# Patient Record
Sex: MALE | Race: WHITE | Employment: FULL TIME | URBAN - METROPOLITAN AREA
[De-identification: names, ages, dates, MRNs, and addresses within clinical notes are randomized per-mention and may not be internally consistent; named-entity substitution may affect disease eponyms.]

---

## 2018-02-12 ENCOUNTER — LAB ENCOUNTER (OUTPATIENT)
Dept: LAB | Facility: HOSPITAL | Age: 58
End: 2018-02-12
Attending: SPECIALIST
Payer: COMMERCIAL

## 2018-02-12 DIAGNOSIS — R05.9 COUGH: ICD-10-CM

## 2018-02-12 DIAGNOSIS — R50.9 FEVER, UNSPECIFIED: Primary | ICD-10-CM

## 2018-02-12 LAB
ADENOVIRUS PCR:: NEGATIVE
ALBUMIN SERPL-MCNC: 3.7 G/DL (ref 3.5–4.8)
ALP LIVER SERPL-CCNC: 80 U/L (ref 45–117)
ALT SERPL-CCNC: 23 U/L (ref 17–63)
AST SERPL-CCNC: 16 U/L (ref 15–41)
B PERT DNA SPEC QL NAA+PROBE: NEGATIVE
BASOPHILS # BLD AUTO: 0.02 X10(3) UL (ref 0–0.1)
BASOPHILS NFR BLD AUTO: 0.3 %
BILIRUB SERPL-MCNC: 0.4 MG/DL (ref 0.1–2)
BUN BLD-MCNC: 17 MG/DL (ref 8–20)
C PNEUM DNA SPEC QL NAA+PROBE: NEGATIVE
C-REACTIVE PROTEIN: 9.26 MG/DL (ref ?–1)
CALCIUM BLD-MCNC: 9.3 MG/DL (ref 8.3–10.3)
CHLORIDE: 102 MMOL/L (ref 101–111)
CK: 173 IU/L (ref 39–308)
CO2: 33 MMOL/L (ref 22–32)
CORONAVIRUS 229E PCR:: NEGATIVE
CORONAVIRUS HKU1 PCR:: NEGATIVE
CORONAVIRUS NL63 PCR:: NEGATIVE
CORONAVIRUS OC43 PCR:: NEGATIVE
CREAT BLD-MCNC: 1.04 MG/DL (ref 0.7–1.3)
EOSINOPHIL # BLD AUTO: 0.07 X10(3) UL (ref 0–0.3)
EOSINOPHIL NFR BLD AUTO: 1 %
ERYTHROCYTE [DISTWIDTH] IN BLOOD BY AUTOMATED COUNT: 12.7 % (ref 11.5–16)
FLUAV RNA SPEC QL NAA+PROBE: NEGATIVE
FLUBV RNA SPEC QL NAA+PROBE: NEGATIVE
GLUCOSE BLD-MCNC: 97 MG/DL (ref 70–99)
HCT VFR BLD AUTO: 44.7 % (ref 37–53)
HGB BLD-MCNC: 15.3 G/DL (ref 13–17)
IMMATURE GRANULOCYTE COUNT: 0.02 X10(3) UL (ref 0–1)
IMMATURE GRANULOCYTE RATIO %: 0.3 %
LYMPHOCYTES # BLD AUTO: 0.96 X10(3) UL (ref 0.9–4)
LYMPHOCYTES NFR BLD AUTO: 13.8 %
M PROTEIN MFR SERPL ELPH: 8.1 G/DL (ref 6.1–8.3)
MCH RBC QN AUTO: 30.8 PG (ref 27–33.2)
MCHC RBC AUTO-ENTMCNC: 34.2 G/DL (ref 31–37)
MCV RBC AUTO: 90.1 FL (ref 80–99)
METAPNEUMOVIRUS PCR:: NEGATIVE
MONOCYTES # BLD AUTO: 1.05 X10(3) UL (ref 0.1–1)
MONOCYTES NFR BLD AUTO: 15.1 %
MYCOPLASMA PNEUMONIA PCR:: NEGATIVE
NEUTROPHIL ABS PRELIM: 4.84 X10 (3) UL (ref 1.3–6.7)
NEUTROPHILS # BLD AUTO: 4.84 X10(3) UL (ref 1.3–6.7)
NEUTROPHILS NFR BLD AUTO: 69.5 %
PARAINFLUENZA 1 PCR:: NEGATIVE
PARAINFLUENZA 2 PCR:: NEGATIVE
PARAINFLUENZA 3 PCR:: NEGATIVE
PARAINFLUENZA 4 PCR:: NEGATIVE
PLATELET # BLD AUTO: 287 10(3)UL (ref 150–450)
POTASSIUM SERPL-SCNC: 4.5 MMOL/L (ref 3.6–5.1)
RBC # BLD AUTO: 4.96 X10(6)UL (ref 4.3–5.7)
RED CELL DISTRIBUTION WIDTH-SD: 42.5 FL (ref 35.1–46.3)
RHINOVIRUS/ENTERO PCR:: NEGATIVE
RSV RNA SPEC QL NAA+PROBE: NEGATIVE
SED RATE-ML: 29 MM/HR (ref 0–12)
SODIUM SERPL-SCNC: 138 MMOL/L (ref 136–144)
URIC ACID: 4.6 MG/DL (ref 2.4–8.7)
WBC # BLD AUTO: 7 X10(3) UL (ref 4–13)

## 2018-02-12 PROCEDURE — 87633 RESP VIRUS 12-25 TARGETS: CPT

## 2018-02-12 PROCEDURE — 87486 CHLMYD PNEUM DNA AMP PROBE: CPT

## 2018-02-12 PROCEDURE — 82550 ASSAY OF CK (CPK): CPT

## 2018-02-12 PROCEDURE — 36415 COLL VENOUS BLD VENIPUNCTURE: CPT

## 2018-02-12 PROCEDURE — 87798 DETECT AGENT NOS DNA AMP: CPT

## 2018-02-12 PROCEDURE — 86140 C-REACTIVE PROTEIN: CPT

## 2018-02-12 PROCEDURE — 85652 RBC SED RATE AUTOMATED: CPT

## 2018-02-12 PROCEDURE — 85025 COMPLETE CBC W/AUTO DIFF WBC: CPT

## 2018-02-12 PROCEDURE — 80053 COMPREHEN METABOLIC PANEL: CPT

## 2018-02-12 PROCEDURE — 84550 ASSAY OF BLOOD/URIC ACID: CPT

## 2018-02-12 PROCEDURE — 87581 M.PNEUMON DNA AMP PROBE: CPT

## 2018-10-13 ENCOUNTER — EMERGENCY (EMERGENCY)
Facility: HOSPITAL | Age: 58
LOS: 0 days | Discharge: HOME | End: 2018-10-13
Attending: EMERGENCY MEDICINE | Admitting: EMERGENCY MEDICINE
Payer: COMMERCIAL

## 2018-10-13 VITALS
SYSTOLIC BLOOD PRESSURE: 156 MMHG | RESPIRATION RATE: 20 BRPM | TEMPERATURE: 98 F | OXYGEN SATURATION: 97 % | DIASTOLIC BLOOD PRESSURE: 104 MMHG | HEART RATE: 77 BPM

## 2018-10-13 DIAGNOSIS — R51 HEADACHE: ICD-10-CM

## 2018-10-13 DIAGNOSIS — G44.009 CLUSTER HEADACHE SYNDROME, UNSPECIFIED, NOT INTRACTABLE: ICD-10-CM

## 2018-10-13 PROCEDURE — 99285 EMERGENCY DEPT VISIT HI MDM: CPT

## 2018-10-13 RX ORDER — SODIUM CHLORIDE 9 MG/ML
2000 INJECTION INTRAMUSCULAR; INTRAVENOUS; SUBCUTANEOUS ONCE
Qty: 0 | Refills: 0 | Status: COMPLETED | OUTPATIENT
Start: 2018-10-13 | End: 2018-10-13

## 2018-10-13 RX ORDER — MORPHINE SULFATE 50 MG/1
4 CAPSULE, EXTENDED RELEASE ORAL ONCE
Qty: 0 | Refills: 0 | Status: DISCONTINUED | OUTPATIENT
Start: 2018-10-13 | End: 2018-10-13

## 2018-10-13 RX ORDER — ONDANSETRON 8 MG/1
4 TABLET, FILM COATED ORAL ONCE
Qty: 0 | Refills: 0 | Status: COMPLETED | OUTPATIENT
Start: 2018-10-13 | End: 2018-10-13

## 2018-10-13 RX ORDER — ACETAMINOPHEN 500 MG
1000 TABLET ORAL ONCE
Qty: 0 | Refills: 0 | Status: COMPLETED | OUTPATIENT
Start: 2018-10-13 | End: 2018-10-13

## 2018-10-13 RX ADMIN — Medication 400 MILLIGRAM(S): at 11:57

## 2018-10-13 RX ADMIN — ONDANSETRON 4 MILLIGRAM(S): 8 TABLET, FILM COATED ORAL at 12:28

## 2018-10-13 RX ADMIN — MORPHINE SULFATE 4 MILLIGRAM(S): 50 CAPSULE, EXTENDED RELEASE ORAL at 11:57

## 2018-10-13 RX ADMIN — SODIUM CHLORIDE 2000 MILLILITER(S): 9 INJECTION INTRAMUSCULAR; INTRAVENOUS; SUBCUTANEOUS at 11:57

## 2018-10-13 NOTE — ED STATDOCS - NS_ ATTENDINGSCRIBEDETAILS _ED_A_ED_FT
I, Baljeet Rivero MD,  performed the initial face to face bedside interview with this patient regarding history of present illness, review of symptoms and relevant past medical, social and family history.  I completed an independent physical examination.    The history, relevant review of systems, past medical and surgical history, medical decision making, and physical examination was documented by the scribe in my presence and I attest to the accuracy of the documentation.

## 2018-10-13 NOTE — ED STATDOCS - ATTENDING CONTRIBUTION TO CARE
I, Baljeet Rivero MD,  performed the initial face to face bedside interview with this patient regarding history of present illness, review of symptoms and relevant past medical, social and family history.  I completed an independent physical examination.  I was the initial provider who evaluated this patient. I have signed out the follow up of any pending tests (i.e. labs, radiological studies) to the ACP.  I have communicated the patient’s plan of care and disposition with the ACP.

## 2018-10-13 NOTE — ED STATDOCS - PROGRESS NOTE DETAILS
PA NOTE: Pt seen by intake physician and orders/plan reviewed. 59 y/o M pmhx cluster headaches, p/w headache and vomiting today. Patient very difficult to obtain history from as he is in extreme pain. Reports that this headache feels like his cluster headaches, which he has been suffering from for many years; last cluster headache 3 weeks ago. Takes Imitrex and sometimes Imitrex inhaler but has tried both today and has not relieved symptoms. Reports that typically he requires oxygen, morphine and zofran to help with his cluster headaches when they are the worst. Reports that he has been hospitalized for his headaches in the past. Denies any vomiting, fevers, chills, neck pain.   PE: GEN: Well Appearing, Nontoxic, significant acute distress secondary to headache. HEENT: NC/AT, Symm Facies. PERRL, EOMI, MMM, posterior pharynx clear. CV: No JVD/Bruits or stridor;  +S1S2, RRR w/o m/g/r. RESP: CTAB w/o w/r/r. ABD: Soft, nt/nd, +BS. No guarding/rebound. No RUQ tender, no CVAT. EXT/MSK: No lower extremity edema or calf tenderness. WWP, palpable pulses. FROMx4. SKIN: No erythema, lesions or rash. Neuro: Grossly intact, AOX3 with normal speech, CN II-XII intact; Sensation intact, motor 5/5 throughout. Gait normal  A/P: Extreme discomfort with tearing and walking around secondary to pain. Provide pain control, O2, anti-emetics, CT head and reassess.   -Billie Mcguire PA-C patient feeling significantly improved, sleeping comfortably. refuses CT head. Dr. Rivero aware. Patient stable for d/c with instructions to continue taking his home medications for headaches and follow-up with neurologist/PCP for continued treatment. -Billie Mcguire PA-C

## 2018-10-13 NOTE — ED STATDOCS - OBJECTIVE STATEMENT
57 y/o male with a PMHx of cluster NAGY presents to the ED c/o HA and vomiting. Girlfriend notes pt suffers from cluster HAs and this is a normal episode for him. Pt has been on medication for 6 weeks, normally uses Amitrax inhaler and oxygen which relieves the pain but has not improved the sx today. Pt has had past hospitalizations for the HA.

## 2018-10-30 ENCOUNTER — EMERGENCY (EMERGENCY)
Facility: HOSPITAL | Age: 58
LOS: 0 days | Discharge: HOME | End: 2018-10-30
Attending: EMERGENCY MEDICINE | Admitting: EMERGENCY MEDICINE
Payer: COMMERCIAL

## 2018-10-30 VITALS
DIASTOLIC BLOOD PRESSURE: 75 MMHG | HEART RATE: 87 BPM | SYSTOLIC BLOOD PRESSURE: 128 MMHG | RESPIRATION RATE: 16 BRPM | OXYGEN SATURATION: 97 %

## 2018-10-30 VITALS — WEIGHT: 149.91 LBS | HEIGHT: 66 IN

## 2018-10-30 DIAGNOSIS — G44.009 CLUSTER HEADACHE SYNDROME, UNSPECIFIED, NOT INTRACTABLE: ICD-10-CM

## 2018-10-30 DIAGNOSIS — R51 HEADACHE: ICD-10-CM

## 2018-10-30 LAB
ALBUMIN SERPL ELPH-MCNC: 3.9 G/DL — SIGNIFICANT CHANGE UP (ref 3.3–5)
ALP SERPL-CCNC: 62 U/L — SIGNIFICANT CHANGE UP (ref 40–120)
ALT FLD-CCNC: 47 U/L — SIGNIFICANT CHANGE UP (ref 12–78)
ANION GAP SERPL CALC-SCNC: 5 MMOL/L — SIGNIFICANT CHANGE UP (ref 5–17)
APTT BLD: 27.4 SEC — LOW (ref 27.5–36.3)
AST SERPL-CCNC: 15 U/L — SIGNIFICANT CHANGE UP (ref 15–37)
BASOPHILS # BLD AUTO: 0.02 K/UL — SIGNIFICANT CHANGE UP (ref 0–0.2)
BASOPHILS NFR BLD AUTO: 0.2 % — SIGNIFICANT CHANGE UP (ref 0–2)
BILIRUB SERPL-MCNC: 0.4 MG/DL — SIGNIFICANT CHANGE UP (ref 0.2–1.2)
BUN SERPL-MCNC: 14 MG/DL — SIGNIFICANT CHANGE UP (ref 7–23)
CALCIUM SERPL-MCNC: 9 MG/DL — SIGNIFICANT CHANGE UP (ref 8.5–10.1)
CHLORIDE SERPL-SCNC: 103 MMOL/L — SIGNIFICANT CHANGE UP (ref 96–108)
CO2 SERPL-SCNC: 31 MMOL/L — SIGNIFICANT CHANGE UP (ref 22–31)
CREAT SERPL-MCNC: 0.98 MG/DL — SIGNIFICANT CHANGE UP (ref 0.5–1.3)
EOSINOPHIL # BLD AUTO: 0.02 K/UL — SIGNIFICANT CHANGE UP (ref 0–0.5)
EOSINOPHIL NFR BLD AUTO: 0.2 % — SIGNIFICANT CHANGE UP (ref 0–6)
GLUCOSE SERPL-MCNC: 96 MG/DL — SIGNIFICANT CHANGE UP (ref 70–99)
HCT VFR BLD CALC: 51.7 % — HIGH (ref 39–50)
HGB BLD-MCNC: 17.3 G/DL — HIGH (ref 13–17)
IMM GRANULOCYTES NFR BLD AUTO: 0.6 % — SIGNIFICANT CHANGE UP (ref 0–1.5)
INR BLD: 0.96 RATIO — SIGNIFICANT CHANGE UP (ref 0.88–1.16)
LYMPHOCYTES # BLD AUTO: 1.2 K/UL — SIGNIFICANT CHANGE UP (ref 1–3.3)
LYMPHOCYTES # BLD AUTO: 11.9 % — LOW (ref 13–44)
MCHC RBC-ENTMCNC: 32.3 PG — SIGNIFICANT CHANGE UP (ref 27–34)
MCHC RBC-ENTMCNC: 33.5 GM/DL — SIGNIFICANT CHANGE UP (ref 32–36)
MCV RBC AUTO: 96.5 FL — SIGNIFICANT CHANGE UP (ref 80–100)
MONOCYTES # BLD AUTO: 0.71 K/UL — SIGNIFICANT CHANGE UP (ref 0–0.9)
MONOCYTES NFR BLD AUTO: 7.1 % — SIGNIFICANT CHANGE UP (ref 2–14)
NEUTROPHILS # BLD AUTO: 8.06 K/UL — HIGH (ref 1.8–7.4)
NEUTROPHILS NFR BLD AUTO: 80 % — HIGH (ref 43–77)
NRBC # BLD: 0 /100 WBCS — SIGNIFICANT CHANGE UP (ref 0–0)
PLATELET # BLD AUTO: 213 K/UL — SIGNIFICANT CHANGE UP (ref 150–400)
POTASSIUM SERPL-MCNC: 4.2 MMOL/L — SIGNIFICANT CHANGE UP (ref 3.5–5.3)
POTASSIUM SERPL-SCNC: 4.2 MMOL/L — SIGNIFICANT CHANGE UP (ref 3.5–5.3)
PROT SERPL-MCNC: 7.4 GM/DL — SIGNIFICANT CHANGE UP (ref 6–8.3)
PROTHROM AB SERPL-ACNC: 10.6 SEC — SIGNIFICANT CHANGE UP (ref 10–12.9)
RBC # BLD: 5.36 M/UL — SIGNIFICANT CHANGE UP (ref 4.2–5.8)
RBC # FLD: 13.8 % — SIGNIFICANT CHANGE UP (ref 10.3–14.5)
SODIUM SERPL-SCNC: 139 MMOL/L — SIGNIFICANT CHANGE UP (ref 135–145)
WBC # BLD: 10.07 K/UL — SIGNIFICANT CHANGE UP (ref 3.8–10.5)
WBC # FLD AUTO: 10.07 K/UL — SIGNIFICANT CHANGE UP (ref 3.8–10.5)

## 2018-10-30 PROCEDURE — 99285 EMERGENCY DEPT VISIT HI MDM: CPT

## 2018-10-30 RX ORDER — KETOROLAC TROMETHAMINE 30 MG/ML
30 SYRINGE (ML) INJECTION ONCE
Qty: 0 | Refills: 0 | Status: DISCONTINUED | OUTPATIENT
Start: 2018-10-30 | End: 2018-10-30

## 2018-10-30 RX ORDER — ONDANSETRON 8 MG/1
4 TABLET, FILM COATED ORAL ONCE
Qty: 0 | Refills: 0 | Status: DISCONTINUED | OUTPATIENT
Start: 2018-10-30 | End: 2018-10-30

## 2018-10-30 RX ORDER — MORPHINE SULFATE 50 MG/1
6 CAPSULE, EXTENDED RELEASE ORAL ONCE
Qty: 0 | Refills: 0 | Status: DISCONTINUED | OUTPATIENT
Start: 2018-10-30 | End: 2018-10-30

## 2018-10-30 RX ORDER — ONDANSETRON 8 MG/1
4 TABLET, FILM COATED ORAL ONCE
Qty: 0 | Refills: 0 | Status: COMPLETED | OUTPATIENT
Start: 2018-10-30 | End: 2018-10-30

## 2018-10-30 RX ORDER — SODIUM CHLORIDE 9 MG/ML
2000 INJECTION INTRAMUSCULAR; INTRAVENOUS; SUBCUTANEOUS ONCE
Qty: 0 | Refills: 0 | Status: COMPLETED | OUTPATIENT
Start: 2018-10-30 | End: 2018-10-30

## 2018-10-30 RX ORDER — HYDROMORPHONE HYDROCHLORIDE 2 MG/ML
1 INJECTION INTRAMUSCULAR; INTRAVENOUS; SUBCUTANEOUS ONCE
Qty: 0 | Refills: 0 | Status: DISCONTINUED | OUTPATIENT
Start: 2018-10-30 | End: 2018-10-30

## 2018-10-30 RX ADMIN — HYDROMORPHONE HYDROCHLORIDE 1 MILLIGRAM(S): 2 INJECTION INTRAMUSCULAR; INTRAVENOUS; SUBCUTANEOUS at 19:28

## 2018-10-30 RX ADMIN — Medication 30 MILLIGRAM(S): at 17:48

## 2018-10-30 RX ADMIN — HYDROMORPHONE HYDROCHLORIDE 1 MILLIGRAM(S): 2 INJECTION INTRAMUSCULAR; INTRAVENOUS; SUBCUTANEOUS at 19:45

## 2018-10-30 RX ADMIN — ONDANSETRON 4 MILLIGRAM(S): 8 TABLET, FILM COATED ORAL at 17:48

## 2018-10-30 RX ADMIN — MORPHINE SULFATE 6 MILLIGRAM(S): 50 CAPSULE, EXTENDED RELEASE ORAL at 18:29

## 2018-10-30 RX ADMIN — SODIUM CHLORIDE 2000 MILLILITER(S): 9 INJECTION INTRAMUSCULAR; INTRAVENOUS; SUBCUTANEOUS at 17:52

## 2018-10-30 NOTE — ED ADULT NURSE NOTE - NSIMPLEMENTINTERV_GEN_ALL_ED
Implemented All Universal Safety Interventions:  [place] to call system. Call bell, personal items and telephone within reach. Instruct patient to call for assistance. Room bathroom lighting operational. Non-slip footwear when patient is off stretcher. Physically safe environment: no spills, clutter or unnecessary equipment. Stretcher in lowest position, wheels locked, appropriate side rails in place.

## 2018-10-30 NOTE — ED ADULT TRIAGE NOTE - WEIGHT METHOD
How are you feeling? OK    Are you having any pain? Where? Incisional pain, hip and back of leg pain is gone. Still  Has numbness in foot    Rate pain from 1-10 - N/A    Are you taking the pain RX? PRN    Do you think it's helping? Yes    Do you feel better than before surgery? Yes, leg pain gone. Numbness is getting better    Dermabond OK? yes    Is you incision red, swollen or bleeding? None, no fever    Are you having any trouble with nausea or constipation? none    Were your discharge instructions easy to understand? yes    Any other questions?    Confirm post op appt -  yes   stated

## 2018-10-30 NOTE — ED STATDOCS - OBJECTIVE STATEMENT
57 y/o male with a PMHx of cluster NAGY presents to the ED c/o NAGY x2 hours. HA is associated with nausea, vomiting. Girlfriend notes pt suffers from cluster HAs and this is a normal episode for him. Pt took Imitrex without relief. Pt seen in [facility] 3 weeks ago for same symptoms, has not followed up with neurology. No fever or any other acute complaints at this time. Wife states that Morphine and Zofran improved pt's symptoms previously.

## 2018-10-30 NOTE — ED ADULT NURSE NOTE - CHIEF COMPLAINT QUOTE
patient has a cluster headache that started 4 hours ago, took imitrex without relief.  pateint  was seen in ER 3 weeks ago for same.  no fever + nausea.  wife states morphine and zofran broke previous headache

## 2018-10-30 NOTE — ED ADULT TRIAGE NOTE - CHIEF COMPLAINT QUOTE
patient has a cluster headache that started 4 hours ago, took imitrex without relief.  pateint  was seen in ER 3 weeks ago for same.  no fever + nausea patient has a cluster headache that started 4 hours ago, took imitrex without relief.  pateint  was seen in ER 3 weeks ago for same.  no fever + nausea.  wife states morphine and zofran broke previous headache

## 2018-10-30 NOTE — ED STATDOCS - PROGRESS NOTE DETAILS
signed Carley Persaud PA-C Pt seen initially in intake by Dr Rivero.   58M PMH cluster HAs c/o severe right sided HA similar to prior. Girlfriend notes pt was recently tapered off prednisone and this historically triggers his HAs. Pt is in obvious extreme discomfort, holding his head in his hands and kicking the wall. Girlfriend notes morphine usually helps at this stage. Will order and reassess. signed Carley Persaud PA-C   Pt still having significant pain though no longer in extremis. Plan CT head and dilaudid. Pt agrees with plan of  care. signed Carley Persaud PA-C signed Carley Persaud PA-C  Pt alert, smiling, states pain is resolved. Pt notes he gets these HAs around spring and fall. Pt refuses CT, notes he has had numerous outpt imaging including CT, MRIs and angiograms. Pt notes his Symptoms were identical to prior episodes. GCS 15. Pt alert, NAD, ambulates with ease in ED. normocephalic atraumatic. no spinal tenderness. CN II-XII grossly intact. neg romberg, neg pronator drift, normal gait including tandem. pupils PERRL 4mm bilat, EOMI. no gross visual field deficit. no dysmetria, neg finger to nose, heel to shin.. outpt f/u neuro. return precautions given. Pt feeling well, agrees with DC and plan of care. getting ride home. No significant findings on labwork. Pt given copy of lab results.

## 2018-10-30 NOTE — ED ADULT NURSE REASSESSMENT NOTE - NS ED NURSE REASSESS COMMENT FT1
Pt shows signs of pain relief from medications. Pt refused to go to CT for head CT as pt states he recently had a CT. DAYRON Persaud made aware of pt refusal. Pt to be discharged. Pt aware as to plan of care at this time. Awaiting discharge instructions.

## 2018-10-31 PROBLEM — G44.009 CLUSTER HEADACHE SYNDROME, UNSPECIFIED, NOT INTRACTABLE: Chronic | Status: ACTIVE | Noted: 2018-10-13

## 2019-02-10 ENCOUNTER — EMERGENCY (EMERGENCY)
Facility: HOSPITAL | Age: 59
LOS: 0 days | Discharge: HOME | End: 2019-02-10
Attending: EMERGENCY MEDICINE | Admitting: EMERGENCY MEDICINE
Payer: COMMERCIAL

## 2019-02-10 VITALS
OXYGEN SATURATION: 98 % | RESPIRATION RATE: 19 BRPM | SYSTOLIC BLOOD PRESSURE: 100 MMHG | HEART RATE: 71 BPM | TEMPERATURE: 98 F | DIASTOLIC BLOOD PRESSURE: 57 MMHG

## 2019-02-10 VITALS
OXYGEN SATURATION: 99 % | WEIGHT: 149.91 LBS | SYSTOLIC BLOOD PRESSURE: 140 MMHG | HEIGHT: 66 IN | DIASTOLIC BLOOD PRESSURE: 88 MMHG | TEMPERATURE: 98 F | HEART RATE: 76 BPM | RESPIRATION RATE: 20 BRPM

## 2019-02-10 DIAGNOSIS — Z87.442 PERSONAL HISTORY OF URINARY CALCULI: ICD-10-CM

## 2019-02-10 DIAGNOSIS — R11.2 NAUSEA WITH VOMITING, UNSPECIFIED: ICD-10-CM

## 2019-02-10 DIAGNOSIS — R10.9 UNSPECIFIED ABDOMINAL PAIN: ICD-10-CM

## 2019-02-10 DIAGNOSIS — R11.10 VOMITING, UNSPECIFIED: ICD-10-CM

## 2019-02-10 DIAGNOSIS — Z88.8 ALLERGY STATUS TO OTHER DRUGS, MEDICAMENTS AND BIOLOGICAL SUBSTANCES: ICD-10-CM

## 2019-02-10 DIAGNOSIS — Z87.19 PERSONAL HISTORY OF OTHER DISEASES OF THE DIGESTIVE SYSTEM: ICD-10-CM

## 2019-02-10 LAB
ALBUMIN SERPL ELPH-MCNC: 4.3 G/DL — SIGNIFICANT CHANGE UP (ref 3.3–5)
ALP SERPL-CCNC: 79 U/L — SIGNIFICANT CHANGE UP (ref 40–120)
ALT FLD-CCNC: 28 U/L — SIGNIFICANT CHANGE UP (ref 12–78)
ANION GAP SERPL CALC-SCNC: 7 MMOL/L — SIGNIFICANT CHANGE UP (ref 5–17)
APPEARANCE UR: CLEAR — SIGNIFICANT CHANGE UP
AST SERPL-CCNC: 19 U/L — SIGNIFICANT CHANGE UP (ref 15–37)
BACTERIA # UR AUTO: NEGATIVE — SIGNIFICANT CHANGE UP
BASOPHILS # BLD AUTO: 0.04 K/UL — SIGNIFICANT CHANGE UP (ref 0–0.2)
BASOPHILS NFR BLD AUTO: 0.2 % — SIGNIFICANT CHANGE UP (ref 0–2)
BILIRUB SERPL-MCNC: 0.6 MG/DL — SIGNIFICANT CHANGE UP (ref 0.2–1.2)
BILIRUB UR-MCNC: NEGATIVE — SIGNIFICANT CHANGE UP
BUN SERPL-MCNC: 17 MG/DL — SIGNIFICANT CHANGE UP (ref 7–23)
CALCIUM SERPL-MCNC: 8.7 MG/DL — SIGNIFICANT CHANGE UP (ref 8.5–10.1)
CHLORIDE SERPL-SCNC: 102 MMOL/L — SIGNIFICANT CHANGE UP (ref 96–108)
CO2 SERPL-SCNC: 29 MMOL/L — SIGNIFICANT CHANGE UP (ref 22–31)
COLOR SPEC: YELLOW — SIGNIFICANT CHANGE UP
CREAT SERPL-MCNC: 1.17 MG/DL — SIGNIFICANT CHANGE UP (ref 0.5–1.3)
DIFF PNL FLD: ABNORMAL
EOSINOPHIL # BLD AUTO: 0.14 K/UL — SIGNIFICANT CHANGE UP (ref 0–0.5)
EOSINOPHIL NFR BLD AUTO: 0.9 % — SIGNIFICANT CHANGE UP (ref 0–6)
EPI CELLS # UR: NEGATIVE — SIGNIFICANT CHANGE UP
GLUCOSE SERPL-MCNC: 122 MG/DL — HIGH (ref 70–99)
GLUCOSE UR QL: NEGATIVE MG/DL — SIGNIFICANT CHANGE UP
HCT VFR BLD CALC: 54.8 % — HIGH (ref 39–50)
HGB BLD-MCNC: 18.6 G/DL — HIGH (ref 13–17)
IMM GRANULOCYTES NFR BLD AUTO: 0.4 % — SIGNIFICANT CHANGE UP (ref 0–1.5)
KETONES UR-MCNC: NEGATIVE — SIGNIFICANT CHANGE UP
LEUKOCYTE ESTERASE UR-ACNC: NEGATIVE — SIGNIFICANT CHANGE UP
LIDOCAIN IGE QN: 156 U/L — SIGNIFICANT CHANGE UP (ref 73–393)
LYMPHOCYTES # BLD AUTO: 1.33 K/UL — SIGNIFICANT CHANGE UP (ref 1–3.3)
LYMPHOCYTES # BLD AUTO: 8.2 % — LOW (ref 13–44)
MAGNESIUM SERPL-MCNC: 2 MG/DL — SIGNIFICANT CHANGE UP (ref 1.6–2.6)
MCHC RBC-ENTMCNC: 31.9 PG — SIGNIFICANT CHANGE UP (ref 27–34)
MCHC RBC-ENTMCNC: 33.9 GM/DL — SIGNIFICANT CHANGE UP (ref 32–36)
MCV RBC AUTO: 94 FL — SIGNIFICANT CHANGE UP (ref 80–100)
MONOCYTES # BLD AUTO: 1.15 K/UL — HIGH (ref 0–0.9)
MONOCYTES NFR BLD AUTO: 7.1 % — SIGNIFICANT CHANGE UP (ref 2–14)
NEUTROPHILS # BLD AUTO: 13.48 K/UL — HIGH (ref 1.8–7.4)
NEUTROPHILS NFR BLD AUTO: 83.2 % — HIGH (ref 43–77)
NITRITE UR-MCNC: NEGATIVE — SIGNIFICANT CHANGE UP
NRBC # BLD: 0 /100 WBCS — SIGNIFICANT CHANGE UP (ref 0–0)
PH UR: 7 — SIGNIFICANT CHANGE UP (ref 5–8)
PLATELET # BLD AUTO: 246 K/UL — SIGNIFICANT CHANGE UP (ref 150–400)
POTASSIUM SERPL-MCNC: 4 MMOL/L — SIGNIFICANT CHANGE UP (ref 3.5–5.3)
POTASSIUM SERPL-SCNC: 4 MMOL/L — SIGNIFICANT CHANGE UP (ref 3.5–5.3)
PROT SERPL-MCNC: 7.8 GM/DL — SIGNIFICANT CHANGE UP (ref 6–8.3)
PROT UR-MCNC: 15 MG/DL
RBC # BLD: 5.83 M/UL — HIGH (ref 4.2–5.8)
RBC # FLD: 12.1 % — SIGNIFICANT CHANGE UP (ref 10.3–14.5)
RBC CASTS # UR COMP ASSIST: SIGNIFICANT CHANGE UP /HPF (ref 0–4)
SODIUM SERPL-SCNC: 138 MMOL/L — SIGNIFICANT CHANGE UP (ref 135–145)
SP GR SPEC: 1.01 — SIGNIFICANT CHANGE UP (ref 1.01–1.02)
UROBILINOGEN FLD QL: NEGATIVE MG/DL — SIGNIFICANT CHANGE UP
WBC # BLD: 16.2 K/UL — HIGH (ref 3.8–10.5)
WBC # FLD AUTO: 16.2 K/UL — HIGH (ref 3.8–10.5)
WBC UR QL: NEGATIVE — SIGNIFICANT CHANGE UP

## 2019-02-10 PROCEDURE — 74177 CT ABD & PELVIS W/CONTRAST: CPT | Mod: 26

## 2019-02-10 PROCEDURE — 93010 ELECTROCARDIOGRAM REPORT: CPT

## 2019-02-10 PROCEDURE — 99284 EMERGENCY DEPT VISIT MOD MDM: CPT | Mod: 25

## 2019-02-10 RX ORDER — KETOROLAC TROMETHAMINE 30 MG/ML
30 SYRINGE (ML) INJECTION ONCE
Qty: 0 | Refills: 0 | Status: DISCONTINUED | OUTPATIENT
Start: 2019-02-10 | End: 2019-02-10

## 2019-02-10 RX ORDER — SODIUM CHLORIDE 9 MG/ML
1000 INJECTION INTRAMUSCULAR; INTRAVENOUS; SUBCUTANEOUS ONCE
Qty: 0 | Refills: 0 | Status: COMPLETED | OUTPATIENT
Start: 2019-02-10 | End: 2019-02-10

## 2019-02-10 RX ORDER — DIPHENHYDRAMINE HCL 50 MG
50 CAPSULE ORAL ONCE
Qty: 0 | Refills: 0 | Status: COMPLETED | OUTPATIENT
Start: 2019-02-10 | End: 2019-02-10

## 2019-02-10 RX ORDER — FAMOTIDINE 10 MG/ML
20 INJECTION INTRAVENOUS ONCE
Qty: 0 | Refills: 0 | Status: COMPLETED | OUTPATIENT
Start: 2019-02-10 | End: 2019-02-10

## 2019-02-10 RX ORDER — HYDROMORPHONE HYDROCHLORIDE 2 MG/ML
1 INJECTION INTRAMUSCULAR; INTRAVENOUS; SUBCUTANEOUS ONCE
Qty: 0 | Refills: 0 | Status: DISCONTINUED | OUTPATIENT
Start: 2019-02-10 | End: 2019-02-10

## 2019-02-10 RX ORDER — ONDANSETRON 8 MG/1
8 TABLET, FILM COATED ORAL ONCE
Qty: 0 | Refills: 0 | Status: COMPLETED | OUTPATIENT
Start: 2019-02-10 | End: 2019-02-10

## 2019-02-10 RX ORDER — ONDANSETRON 8 MG/1
4 TABLET, FILM COATED ORAL ONCE
Qty: 0 | Refills: 0 | Status: COMPLETED | OUTPATIENT
Start: 2019-02-10 | End: 2019-02-10

## 2019-02-10 RX ORDER — ONDANSETRON 8 MG/1
1 TABLET, FILM COATED ORAL
Qty: 15 | Refills: 0 | OUTPATIENT
Start: 2019-02-10

## 2019-02-10 RX ORDER — ACETAMINOPHEN 500 MG
1000 TABLET ORAL ONCE
Qty: 0 | Refills: 0 | Status: COMPLETED | OUTPATIENT
Start: 2019-02-10 | End: 2019-02-10

## 2019-02-10 RX ADMIN — ONDANSETRON 8 MILLIGRAM(S): 8 TABLET, FILM COATED ORAL at 05:07

## 2019-02-10 RX ADMIN — SODIUM CHLORIDE 1000 MILLILITER(S): 9 INJECTION INTRAMUSCULAR; INTRAVENOUS; SUBCUTANEOUS at 08:08

## 2019-02-10 RX ADMIN — Medication 0.5 MILLIGRAM(S): at 08:14

## 2019-02-10 RX ADMIN — Medication 30 MILLIGRAM(S): at 05:35

## 2019-02-10 RX ADMIN — Medication 125 MILLIGRAM(S): at 09:32

## 2019-02-10 RX ADMIN — SODIUM CHLORIDE 1000 MILLILITER(S): 9 INJECTION INTRAMUSCULAR; INTRAVENOUS; SUBCUTANEOUS at 09:54

## 2019-02-10 RX ADMIN — Medication 400 MILLIGRAM(S): at 08:54

## 2019-02-10 RX ADMIN — Medication 30 MILLIGRAM(S): at 05:17

## 2019-02-10 RX ADMIN — HYDROMORPHONE HYDROCHLORIDE 1 MILLIGRAM(S): 2 INJECTION INTRAMUSCULAR; INTRAVENOUS; SUBCUTANEOUS at 09:45

## 2019-02-10 RX ADMIN — SODIUM CHLORIDE 1000 MILLILITER(S): 9 INJECTION INTRAMUSCULAR; INTRAVENOUS; SUBCUTANEOUS at 05:07

## 2019-02-10 RX ADMIN — FAMOTIDINE 20 MILLIGRAM(S): 10 INJECTION INTRAVENOUS at 05:07

## 2019-02-10 RX ADMIN — SODIUM CHLORIDE 2000 MILLILITER(S): 9 INJECTION INTRAMUSCULAR; INTRAVENOUS; SUBCUTANEOUS at 06:57

## 2019-02-10 RX ADMIN — SODIUM CHLORIDE 2000 MILLILITER(S): 9 INJECTION INTRAMUSCULAR; INTRAVENOUS; SUBCUTANEOUS at 08:54

## 2019-02-10 RX ADMIN — HYDROMORPHONE HYDROCHLORIDE 1 MILLIGRAM(S): 2 INJECTION INTRAMUSCULAR; INTRAVENOUS; SUBCUTANEOUS at 09:11

## 2019-02-10 RX ADMIN — ONDANSETRON 4 MILLIGRAM(S): 8 TABLET, FILM COATED ORAL at 08:37

## 2019-02-10 RX ADMIN — Medication 50 MILLIGRAM(S): at 09:32

## 2019-02-10 NOTE — ED PROVIDER NOTE - OBJECTIVE STATEMENT
Pt. is a 58 yo M with a hx of cluster headache, kidney stones, ileitis now retired and lives alone presents BIB ambulance for sudden onset of nausea and vomiting for 6 hours.  Pt. states he had dull achy abdominal cramps all day.  Pt. had small normal BM last night.  Pt. denies any travel, chest pain, shortness of breath, blood in vomit or stool.  Pt. took 1  zofran ODT last night without relief.

## 2019-02-10 NOTE — ED ADULT NURSE NOTE - NSIMPLEMENTINTERV_GEN_ALL_ED
Implemented All Universal Safety Interventions:  [place] to call system. Call bell, personal items and telephone within reach. Instruct patient to call for assistance. Room bathroom lighting operational. Non-slip footwear when patient is off stretcher. Physically safe environment: no spills, clutter or unnecessary equipment. Stretcher in lowest position, wheels locked, appropriate side rails in place.

## 2019-02-10 NOTE — ED PROVIDER NOTE - NSFOLLOWUPINSTRUCTIONS_ED_ALL_ED_FT

## 2019-02-10 NOTE — ED ADULT NURSE REASSESSMENT NOTE - NS ED NURSE REASSESS COMMENT FT1
after receiving ativan pt hands and feet began to get itchy, hand swelling and lip swelling noted. Medications given per orders. Will CTM

## 2019-02-10 NOTE — ED ADULT NURSE REASSESSMENT NOTE - NS ED NURSE REASSESS COMMENT FT1
pt tolerating PO at this time, pt states itching and lip swelling having subsided, airway intact. Ok for d/c per MD olguin.

## 2019-02-10 NOTE — ED PROVIDER NOTE - PROGRESS NOTE DETAILS
Attending Kapoor, reviewed with pt results of tests.  Pt still with some nausea and allergies to reglan.  Pt on xanax at home.  Will give ativan 0.5 mg iv.  pt states is not driving. Attending Kapoor, pt states that he is on long acting opiot - like suboxione without the narcan (alergic to narcan).  Pt has not been able to take medication for 24 hours.  Possible pt symptoms could be withdrawal related.  Will give Dilaudid and more fluids. Ted LAIRD for ED attending, Dr. Kapoor: Pt now with swelling of hands and feet, itching of hands and feet, possible swelling of the lips. Pt states symptoms started after Ativan. Questionable allergic reaction to Ativan. Plan: Solumedrol and Benadryl. Attending Kapoor, pt feeling better, swelling hands improved.  No longer nauseous.  Plan po challenge and if tolerate then d.c.  Pt with probable allergies to IV ativan.

## 2019-07-03 PROBLEM — Z00.00 ENCOUNTER FOR PREVENTIVE HEALTH EXAMINATION: Status: ACTIVE | Noted: 2019-07-03

## 2019-07-12 ENCOUNTER — APPOINTMENT (OUTPATIENT)
Dept: DERMATOLOGY | Facility: CLINIC | Age: 59
End: 2019-07-12
Payer: COMMERCIAL

## 2019-07-12 ENCOUNTER — APPOINTMENT (OUTPATIENT)
Dept: DERMATOLOGY | Facility: CLINIC | Age: 59
End: 2019-07-12

## 2019-07-12 VITALS — BODY MASS INDEX: 29.16 KG/M2 | WEIGHT: 175 LBS | HEIGHT: 65 IN

## 2019-07-12 PROCEDURE — 99202 OFFICE O/P NEW SF 15 MIN: CPT

## 2019-07-29 ENCOUNTER — APPOINTMENT (OUTPATIENT)
Dept: DERMATOLOGY | Facility: CLINIC | Age: 59
End: 2019-07-29
Payer: COMMERCIAL

## 2019-07-29 VITALS — SYSTOLIC BLOOD PRESSURE: 120 MMHG | DIASTOLIC BLOOD PRESSURE: 80 MMHG

## 2019-07-29 DIAGNOSIS — R61 GENERALIZED HYPERHIDROSIS: ICD-10-CM

## 2019-07-29 DIAGNOSIS — Z12.83 ENCOUNTER FOR SCREENING FOR MALIGNANT NEOPLASM OF SKIN: ICD-10-CM

## 2019-07-29 DIAGNOSIS — Z85.828 PERSONAL HISTORY OF OTHER MALIGNANT NEOPLASM OF SKIN: ICD-10-CM

## 2019-07-29 DIAGNOSIS — D22.9 MELANOCYTIC NEVI, UNSPECIFIED: ICD-10-CM

## 2019-07-29 PROCEDURE — 99214 OFFICE O/P EST MOD 30 MIN: CPT | Mod: GC

## 2019-07-29 RX ORDER — ALUMINUM CHLORIDE 20 %
20 SOLUTION, NON-ORAL TOPICAL
Qty: 1 | Refills: 0 | Status: ACTIVE | COMMUNITY
Start: 2019-07-29 | End: 1900-01-01

## 2019-07-29 RX ORDER — BUPROPION HYDROCHLORIDE 300 MG/1
300 TABLET, EXTENDED RELEASE ORAL
Refills: 0 | Status: ACTIVE | COMMUNITY

## 2019-07-29 RX ORDER — MIRTAZAPINE 30 MG/1
30 TABLET, FILM COATED ORAL
Refills: 0 | Status: ACTIVE | COMMUNITY

## 2021-02-26 NOTE — ED STATDOCS - CARE PLAN
"Chief Complaint   Patient presents with   • Coronary Artery Disease     F/V DX: Coronary artery disease involving native coronary artery of native heart without angina pectoris       Subjective:   Nancy King is a 85 y.o. female who presents today for annual follow up of coronary artery disease.    Since the patient's last visit on 02/19/20, she has been doing well clinically. She denies chest pain, shortness of breath, palpitations, nausea/vomiting or diaphoresis.    Past Medical History:   Diagnosis Date   • Acute venous embolism and thrombosis of unspecified deep vessels of lower extremity    • Angina pectoris 5/11/2012    \"with stress\"   • Benign essential HTN 5/11/2012   • CAD (coronary artery disease) 5/11/2012   • Cancer (HCC)     right breast   • Chronic anticoagulation 5/11/2012   • Heart burn    • Hyperlipidemia 5/11/2012   • Hyperlipoproteinemia 5/11/2012   • Indigestion    • Obesity 5/11/2012   • Other specified disorder of intestines     diarrhea   • Pulmonary embolism (HCC)    • Unspecified cataract     removed bilat   • Unspecified disorder of thyroid    • Unspecified urinary incontinence      Past Surgical History:   Procedure Laterality Date   • MASTECTOMY  1/12/2015    Performed by Dwight Birch M.D. at SURGERY [facility]   • NODE BIOPSY SENTINEL  1/12/2015    Performed by Dwight Birch M.D. at SURGERY [facility]   • AXILLARY NODE DISSECTION  1/12/2015    Performed by Dwight Birch M.D. at SURGERY [facility]   • APPENDECTOMY     • CHOLECYSTECTOMY     • HYSTERECTOMY, TOTAL ABDOMINAL     • OTHER      Bladder   • TONSILLECTOMY AND ADENOIDECTOMY       Family History   Problem Relation Age of Onset   • Heart Disease Mother    • Heart Disease Father      Social History     Socioeconomic History   • Marital status:      Spouse name: Not on file   • Number of children: Not on file   • Years of education: Not on file   • Highest education level: Not on file "   Occupational History   • Not on file   Tobacco Use   • Smoking status: Never Smoker   • Smokeless tobacco: Never Used   Substance and Sexual Activity   • Alcohol use: No   • Drug use: No   • Sexual activity: Not on file   Other Topics Concern   • Not on file   Social History Narrative   • Not on file     Social Determinants of Health     Financial Resource Strain:    • Difficulty of Paying Living Expenses:    Food Insecurity:    • Worried About Running Out of Food in the Last Year:    • Ran Out of Food in the Last Year:    Transportation Needs:    • Lack of Transportation (Medical):    • Lack of Transportation (Non-Medical):    Physical Activity:    • Days of Exercise per Week:    • Minutes of Exercise per Session:    Stress:    • Feeling of Stress :    Social Connections:    • Frequency of Communication with Friends and Family:    • Frequency of Social Gatherings with Friends and Family:    • Attends [employer] Services:    • Active Member of Clubs or Organizations:    • Attends Club or Organization Meetings:    • Marital Status:    Intimate Partner Violence:    • Fear of Current or Ex-Partner:    • Emotionally Abused:    • Physically Abused:    • Sexually Abused:      Allergies   Allergen Reactions   • Sulfa Drugs      Swelling   • Levaquin      Rash     (Medications reviewed.)  Outpatient Encounter Medications as of 2/26/2021   Medication Sig Dispense Refill   • metoprolol (LOPRESSOR) 25 MG Tab TAKE 1 TABLET BY MOUTH TWO  TIMES DAILY 60 Tab 2   • losartan (COZAAR) 50 MG Tab Take 1 Tab by mouth every day. 90 Tab 3   • warfarin (COUMADIN) 1 MG Tab Take three tablets daily as directed by [facility] 270 Tab 1   • nitroglycerin (NITROSTAT) 0.4 MG SL Tab place 1 tablet under the tongue if needed for chest pain 25 Tab 5   • simvastatin (ZOCOR) 40 MG Tab TAKE 1 TABLET BY MOUTH  EVERY MORNING 90 Tab 0   • Capsaicin-Menthol (PAIN RELIEF EX) by Apply externally route.     • SITagliptin (JANUVIA) 100 MG  "Tab Take 100 mg by mouth every day.     • levothyroxine (SYNTHROID) 50 MCG TABS Take 50 mcg by mouth every morning before breakfast.     • Cholecalciferol (VITAMIN D PO) Take 1 Tab by mouth every morning.     • ascorbic acid (ASCORBIC ACID) 500 MG TABS Take 500 mg by mouth every morning.       No facility-administered encounter medications on file as of 2/26/2021.     Review of Systems   Constitutional: Negative.  Negative for chills, fever, malaise/fatigue and weight loss.   HENT: Negative.  Negative for hearing loss.    Eyes: Negative.  Negative for blurred vision and double vision.   Respiratory: Negative.  Negative for cough and shortness of breath.    Cardiovascular: Negative.  Negative for chest pain, palpitations, claudication and leg swelling.   Gastrointestinal: Negative.  Negative for abdominal pain, nausea and vomiting.   Genitourinary: Negative.  Negative for dysuria and urgency.   Musculoskeletal: Negative.  Negative for joint pain and myalgias.   Skin: Negative.  Negative for itching and rash.   Neurological: Negative.  Negative for dizziness, focal weakness, weakness and headaches.   Endo/Heme/Allergies: Negative.  Does not bruise/bleed easily.   Psychiatric/Behavioral: Negative.  Negative for depression. The patient is not nervous/anxious.         Objective:   /92 (BP Location: Left arm, Patient Position: Sitting, BP Cuff Size: Adult)   Pulse 66   Resp 14   Ht 1.651 m (5' 5\")   Wt 81.6 kg (180 lb)   SpO2 97%   BMI 29.95 kg/m²     Physical Exam   Constitutional: She is oriented to person, place, and time. She appears well-developed and well-nourished.   HENT:   Head: Normocephalic and atraumatic.   Eyes: EOM are normal.   Neck: No JVD present.   Cardiovascular: Normal rate, regular rhythm and normal heart sounds.   Pulmonary/Chest: Effort normal and breath sounds normal.   Abdominal: Soft. Bowel sounds are normal.   No hepatosplenomegaly.   Musculoskeletal:         General: Normal range of " motion.   Lymphadenopathy:     She has no cervical adenopathy.   Neurological: She is alert and oriented to person, place, and time.   Skin: Skin is warm and dry.   Psychiatric: She has a normal mood and affect.     CARDIAC STUDIES/PROCEDURES:     CTA OF CHEST (01/30/15)  1.  Extensive pulmonary thromboembolism involving majority of right pulmonary arteries and significant portions of left pulmonary arteries  2.  No other significant finding  3.  Right breast postoperative changes  4.  Aortic atherosclerotic plaque  5.  Infiltration of liver     ECHOCARDIOGRAM CONCLUSIONS (06/11/18)  Prior echo done 02/01/15. Compared to the report of the study done -   there has been no significant change.   Normal left ventricular systolic function.  Left ventricular ejection fraction is visually estimated to be 60%.  Grade I diastolic dysfunction.  Mild mitral regurgitation.  Mild tricuspid regurgitation.  Estimated right ventricular systolic pressure is 25 mmHg.     ECHOCARDIOGRAM CONCLUSIONS (02/01/15)  No prior study is available for comparison.   Normal left ventricular size and function. Grade I diastolic   dysfunction - mitral inflow E/A is <1.0. Left ventricular ejection   fraction is 60% to 65%. Mild concentric left ventricular hypertrophy.  No significant valve abnormalities.   Right ventricular systolic pressure is estimated to be 45-50 mmHg.  Normal pericardium without effusion.     EKG performed on (05/24/18) EKG shows sinus rhythm with diffuse ST abnormalities.     Laboratory results of (06/15/18) Cholesterol profile of 172/147/63/80 noted.  Laboratory results of (02/05/15) Cholesterol profile of 129/144/34/66 noted.     LOWER EXTREMITY VENOUS ULTRASOUND (01/30/15)  1.  Normal right lower extremity superficial and deep venous examination.   2.  Left lower extremity deep and superficial venous thrombosis.   3.  Left popliteal cyst.     MYOCARDIAL PERFUSION STUDY CONCLUSIONS (06/15/18)  Very small reversible defect is  noted at the apical cap in the stress images, concerning for ischemia. Artifact cannot be ruled out.  Normal left ventricular wall motion, with EF of 75%.   ECG INTERPRETATION  Negative stress ECG for ischemia.    Assessment:     1. Coronary artery disease involving native coronary artery of native heart without angina pectoris     2. Benign essential HTN     3. Dyslipidemia         Medical Decision Making:  Today's Assessment / Status / Plan:     1. Coronary artery disease (of left anterior descending artery diagnosed in [place], [place] 1984): She remains clinically doing well. I will continue with current medical care including metoprolol, losartan and simvastatin.  2. Hypertension: Blood pressure is high today, however, usually well controlled. We will continue with medical therapy including beta blockade therapy and angiotensin receptor blockade. She will continue to monitor her blood pressure and contact us if her blood pressure remains elevated.  3. Hyperlipidemia: She is doing well on statin therapy without myalgia symptoms. We will repeat labs including fasting lipid profile now and in oone year.  4. Pulmonary embolism with deep venous thrombosis on chronic anticoagulation therapy (warfarin): She is clinically doing well without recurrence or chest pain or shortness of breath. She has chronic edema of her left lower extremity.     We will follow up the patient in one year.     CC Dee Saldana      Principal Discharge DX:	Cluster headache  Assessment and plan of treatment:	1. Continue your home medications as directed for your cluster headaches   2. Follow-up with your neurologist or primary care physician for reevaluation and continued treatment   3. Return to the ER for any new or worsening symptoms

## 2021-10-13 NOTE — ED ADULT TRIAGE NOTE - PRO INTERPRETER NEED 2
From: Daniel Norman  Sent: 10/13/2021 1:16 PM CDT  To: Carmen Zhong Clinical Staff  Subject: Appointment    Can you schedule me for the next Saturday morning appointment available English

## 2023-03-29 ENCOUNTER — APPOINTMENT (OUTPATIENT)
Dept: PAIN MANAGEMENT | Facility: CLINIC | Age: 63
End: 2023-03-29

## 2023-06-27 NOTE — ED ADULT TRIAGE NOTE - PAIN RATING/NUMBER SCALE (0-10): REST
[facility]  your CPAP machine on Thursday 29th at10:15 AM  [email or web address]   [address]    _______________      You will receive your CPAP follow up appointment date today - please bring your CPAP machine and all the supplies +power cord to your follow up visit with me.    10

## 2023-07-11 NOTE — ED ADULT NURSE NOTE - RN DISCHARGE SIGNATURE
13-Oct-2018
Call bell, personal items and telephone in reach/Instruct patient to call for assistance before getting out of bed or chair/Non-slip footwear when patient is out of bed/[place] to call system/Physically safe environment - no spills, clutter or unnecessary equipment/Purposeful Proactive Rounding/Room/bathroom lighting operational, light cord in reach

## 2023-08-29 ENCOUNTER — TRANSCRIPTION ENCOUNTER (OUTPATIENT)
Age: 63
End: 2023-08-29

## 2023-08-29 ENCOUNTER — EMERGENCY (EMERGENCY)
Facility: HOSPITAL | Age: 63
LOS: 0 days | Discharge: HOME | End: 2023-08-29
Attending: EMERGENCY MEDICINE
Payer: COMMERCIAL

## 2023-08-29 VITALS
OXYGEN SATURATION: 98 % | RESPIRATION RATE: 18 BRPM | SYSTOLIC BLOOD PRESSURE: 125 MMHG | TEMPERATURE: 98 F | HEART RATE: 67 BPM | DIASTOLIC BLOOD PRESSURE: 90 MMHG

## 2023-08-29 VITALS — WEIGHT: 164.91 LBS | HEIGHT: 64 IN

## 2023-08-29 DIAGNOSIS — S92.511A DISPLACED FRACTURE OF PROXIMAL PHALANX OF RIGHT LESSER TOE(S), INITIAL ENCOUNTER FOR CLOSED FRACTURE: ICD-10-CM

## 2023-08-29 DIAGNOSIS — T14.8XXA OTHER INJURY OF UNSPECIFIED BODY REGION, INITIAL ENCOUNTER: ICD-10-CM
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
## 2023-08-29 DIAGNOSIS — Z88.8 ALLERGY STATUS TO OTHER DRUGS, MEDICAMENTS AND BIOLOGICAL SUBSTANCES: ICD-10-CM

## 2023-08-29 DIAGNOSIS — M79.674 PAIN IN RIGHT TOE(S): ICD-10-CM
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
## 2023-08-29 PROCEDURE — 28515 TREATMENT OF TOE FRACTURE: CPT | Mod: T9

## 2023-08-29 PROCEDURE — 73630 X-RAY EXAM OF FOOT: CPT | Mod: RT

## 2023-08-29 PROCEDURE — 73630 X-RAY EXAM OF FOOT: CPT | Mod: 26,RT,77

## 2023-08-29 PROCEDURE — 73630 X-RAY EXAM OF FOOT: CPT | Mod: 26,RT

## 2023-08-29 PROCEDURE — 99285 EMERGENCY DEPT VISIT HI MDM: CPT | Mod: 25

## 2023-08-29 PROCEDURE — 99285 EMERGENCY DEPT VISIT HI MDM: CPT

## 2023-08-29 RX ORDER — OXYCODONE HYDROCHLORIDE 5 MG/1
1 TABLET ORAL
Qty: 12 | Refills: 0
Start: 2023-08-29 | End: 2023-09-01

## 2023-08-29 NOTE — CONSULT NOTE ADULT - ASSESSMENT
Assesment: 62 y/o Male seen for the following:   -Close fracture of R 5th proximal phalanx, displaced  -Difficulty with ambulation      Plan:   Chart reviewed and Patient evaluated;  Discussed diagnosis and treatment with patient. Discussed importance of daily foot examinations, proper shoe gear, importance of tight glycemic control.   X-rays reviewed : on wet read showing cortical disruption of R 5th proximal phalanx with lateral deviation.   X-rays of post reduction showing adequate reduction of R 5th proximal phalanx  R 5th toe reduced in acceptable anatomical alignment in ED.   Applied kari splint to R foot  WBAT to R foot, surgical shoe for ambulation  RICE therapy  Pt to follow up with Dr. Garrett in 1 week as outpt  All additional care per ED appreciated  Patient demonstrated verbal understanding of all interventions and tolerated interventions well without any complications.       Case D/W attending Dr. Garrett

## 2023-08-29 NOTE — ED ADULT TRIAGE NOTE - CHIEF COMPLAINT QUOTE
pt c/o right 5th toe pain s/p hitting it into a wall. denies cp/sob/n/v/d/fever/chills. denies any significant pmh.

## 2023-08-29 NOTE — ED STATDOCS - PATIENT PORTAL LINK FT
You can access the FollowMyHealth Patient Portal offered by [facility] by registering at the following website: [email or web address]. By joining [facility]’s FollowMyHealth portal, you will also be able to view your health information using other applications (apps) compatible with our system.

## 2023-08-29 NOTE — PROCEDURE NOTE - NSPOSTCAREGUIDE_GEN_A_CORE
Verbal/written post procedure instructions were given to patient/caregiver/Instructed patient/caregiver to follow-up with primary care physician/Instructed patient/caregiver regarding signs and symptoms of infection/Elevate the injured extremity as instructed/Keep the cast/splint/dressing clean and dry/Understanding of care for injured extremity verbalized

## 2023-08-29 NOTE — CONSULT NOTE ADULT - SUBJECTIVE AND OBJECTIVE BOX
Subjective and Objective:   Date of Consult: 8/29/23  HPI: · Objective Statement: 64 yo male presents to the ED c/o 5th toe pain s/p hitting it into a wall.    PMH:   PSH:    Allergies:No Known Allergies      Labs:      WBC Trend      Chem      Vital Signs Last 24 Hrs  T(C): 36.5 (29 Aug 2023 10:44), Max: 36.5 (29 Aug 2023 10:44)  T(F): 97.7 (29 Aug 2023 10:44), Max: 97.7 (29 Aug 2023 10:44)  HR: 67 (29 Aug 2023 10:44) (67 - 67)  BP: 125/90 (29 Aug 2023 10:44) (125/90 - 125/90)  BP(mean): --  RR: 18 (29 Aug 2023 10:44) (18 - 18)  SpO2: 98% (29 Aug 2023 10:44) (98% - 98%)    Parameters below as of 29 Aug 2023 10:44  Patient On (Oxygen Delivery Method): room air            REVIEW OF SYSTEMS:    CONSTITUTIONAL: No weakness, fevers or chills  EYES: No visual changes  RESPIRATORY: No cough, wheezing; No shortness of breath  CARDIOVASCULAR: No chest pain or palpitations  GASTROINTESTINAL: No abdominal or epigastric pain. No nausea, vomiting; No diarrhea or constipation.   GENITOURINARY: No dysuria, frequency or hematuria  NEUROLOGICAL: No numbness or weakness  SKIN: See physical examination.  All other review of systems is negative unless indicated above    Physical Exam:   Constitutional: NAD, alert;  Lower Extremity Focus  Derm:  Skin warm, dry and supple bilateral.    No open wounds or lesions.    Vascular: Dorsalis Pedis and Posterior Tibial pulses 2/4.  Capillary re-fill time less then 3 seconds digits 1-5 bilateral.    Neuro: Protective sensation intact/diminished to the level of the digits bilateral.  MSK: Muscle strength 5/5 all major muscle groups bilateral. Lateral deviation of R 5th toe.

## 2023-08-29 NOTE — ED STATDOCS - OBJECTIVE STATEMENT
64 yo male presents to the ED c/o 5th toe pain s/p hitting it into a wall. 62 yo male presents to the ED c/o 5th toe pain s/p hitting it into a wall. Today.  + deformity to 5th toe right side.  No chest pain, no sob,

## 2023-08-29 NOTE — ED STATDOCS - ATTENDING APP SHARED VISIT CONTRIBUTION OF CARE
I, Lora Kapoor MD,  performed the initial face to face bedside interview with this patient regarding history of present illness, review of symptoms and relevant past medical, social and family history.  I completed an independent physical examination.  I was the initial provider who evaluated this patient.   I personally saw the patient and performed a substantive portion of the visit including all aspects of the medical decision making.  I have signed out the follow up of any pending tests (i.e. labs, radiological studies) to the TAYLOR.  I have communicated the patient’s plan of care and disposition with the TAYLOR.  The history, relevant review of systems, past medical and surgical history, medical decision making, and physical examination was documented by the scribe in my presence and I attest to the accuracy of the documentation.

## 2023-08-29 NOTE — ED STATDOCS - NSFOLLOWUPINSTRUCTIONS_ED_ALL_ED_FT
Toe Fracture  A foot showing fractures in the bones of the first two toes.  A toe fracture is a break in one of the toe bones (phalanges). A toe fracture may be:  A crack in the surface of the bone (stress fracture). This often occurs in athletes.  A break all the way through the bone (complete fracture).  What are the causes?  This condition may be caused by:  Direct impact, such as from dropping a heavy object on your toe.  Stubbing your toe.  Twisting or stretching your toe out of place.  Overuse or repetitive exercise.  What increases the risk?  You are more likely to develop this condition if you:  Play contact sports.  Have a condition that causes the bones to become thin and brittle (osteoporosis).  Have a low calcium level.  What are the signs or symptoms?  Bones and joints of the foot and toe showing a fracture and blood beneath the toenail.  The main symptoms of this condition are swelling and pain in the toe. Other symptoms include:  Bruising.  Stiffness.  Numbness.  A change in the way the toe looks.  Broken bones that poke through the skin.  Blood beneath the toenail.  How is this diagnosed?  This condition is diagnosed with a physical exam. You may also have X-rays.    How is this treated?  Treatment for this condition depends on the type of fracture and its severity. Treatment may include:  Taping the broken toe to a toe that is next to it (kari taping). This is the most common treatment for fractures in which the bone has not moved out of place (non-displaced fracture).  Wearing a shoe that has a wide, rigid sole to protect the toe and to limit its movement.  Wearing a walking cast.  Having a procedure to move the toe back into place.  Surgery. This may be needed if the:  Pieces of broken bone are out of place (displaced).  Bone breaks through the skin.  Physical therapy. This is done to help regain movement and strength in the toe.  You may need follow-up X-rays to make sure that the bone is healing well and staying in position.    Follow these instructions at home:  If you have a shoe:    Wear the shoe as told by your health care provider. Remove it only as told by your health care provider.  Loosen the shoe if your toes tingle, become numb, or turn cold and blue.  Keep the shoe clean and dry.  If you have a cast:    Do not put pressure on any part of the cast until it is fully hardened. This may take several hours.  Do not stick anything inside the cast to scratch your skin. Doing that increases your risk of infection.  Check the skin around the cast every day. Tell your health care provider about any concerns.  You may put lotion on dry skin around the edges of the cast. Do not put lotion on the skin underneath the cast.  Keep the cast clean and dry.  Bathing    Do not take baths, swim, or use a hot tub until your health care provider approves. Ask your health care provider if you can take showers.  If the shoe or cast is not waterproof:  Do not let it get wet.  Cover it with a watertight covering when you take a bath or a shower.  Activity    Do not use the injured foot to support your body weight until your health care provider says that you can. Use crutches as directed.  Ask your health care provider:  What activities are safe for you during recovery.  What activities you need to avoid.  Do physical therapy exercises as directed.  Driving    Do not drive or use heavy machinery while taking pain medicine.  Do not drive while wearing a cast on a foot that you use for driving.  Managing pain, stiffness, and swelling    A bag of ice on a towel on the skin.  If directed, put ice on painful areas:  Put ice in a plastic bag.  Place a towel between your skin and the bag.  If you have a shoe, remove it as told by your health care provider.  If you have a cast, place a towel between your cast and the bag.  Leave the ice on for 20 minutes, 2–3 times per day.  Raise (elevate) the injured area above the level of your heart while you are sitting or lying down.  General instructions    If your toe was treated with kari taping, follow your health care provider's instructions for changing the gauze and tape. Change it more often if:  The gauze and tape get wet. If this happens, dry the space between the toes.  The gauze and tape are too tight and cause your toe to become pale or numb.  If you were not given a protective shoe, wear sturdy, supportive shoes. Your shoes should not pinch your toes and should not fit tightly against your toes.  Do not use any products that contain nicotine or tobacco, such as cigarettes and e-cigarettes. These can delay bone healing. If you need help quitting, ask your health care provider.  Take over-the-counter and prescription medicines only as told by your health care provider.  Keep all follow-up visits as told by your health care provider. This is important.  Contact a health care provider if you have:  Pain that gets worse or does not get better with medicine.  A fever.  A bad smell coming from your cast.  Get help right away if you have:  Any of the following in your toes or your foot:  Numbness that gets worse.  Tingling.  Coldness.  Blue skin.  Redness or swelling that gets worse.  Pain that suddenly becomes severe.  Summary  A toe fracture is a break in one of the toe bones (phalanges).  Treatment depends on how severe your fracture is and how the pieces of the broken bone line up with each other. Treatment may include kari taping, wearing a shoe or a cast, or using crutches.  Ice and elevate your foot to help lessen the pain and swelling.

## 2023-08-29 NOTE — ED STATDOCS - PROGRESS NOTE DETAILS
64 yo male presents with R toe pain s/p hitting it against a wall. +deformity. Will check XR and reeval. -Max Del Rosario PA-C +displaced 5th toe fracture. Spoke with podiatry will come to see pt. -Max Del Rosario PA-C Podiatry came and reduced fracture. Post reduction films performed. WIll d/c rita with podiatry referral. -Max Del Rosario PA-C

## 2023-08-29 NOTE — ED POST DISCHARGE NOTE - RESULT SUMMARY
Pharmacy out of oxycodone. Sent new RX to [facility]. - Jaclyn Acevedo PA-C

## 2023-08-29 NOTE — ED STATDOCS - CARE PROVIDER_API CALL
Reinaldo Garrett  Podiatric Medicine and Surgery  [address] [place] [address]  Phone: [phone]  Fax: [phone]  Follow Up Time:

## 2023-08-29 NOTE — ED STATDOCS - MUSCULOSKELETAL, MLM
right foot deformity of 4th digit right foot deformity of 5th digit right foot angulated and abducted of 5th digit, cap refill < 2 sec, sensation intact

## 2024-11-01 ENCOUNTER — APPOINTMENT (OUTPATIENT)
Dept: NEUROLOGY | Facility: CLINIC | Age: 64
End: 2024-11-01

## 2024-11-08 NOTE — ED STATDOCS - CONSTITUTIONAL, MLM
Allopurinol is a gout maintenance medication that is not going to have any effect unfortunately on an acute flare.  If the Indocin is not helping we could add a Medrol pack for him to start ASAP.  If he has had these recurrent gout attacks Maliha may want to consider starting him on allopurinol to prevent   normal... well appearing, well nourished, and in no apparent distress.

## 2024-12-23 NOTE — ED STATDOCS - NS ED ATTENDING STATEMENT MOD
Unable to proceed with leadless device. To proceed with standard pacemaker implant.  I have personally performed a face to face diagnostic evaluation on this patient. I have reviewed the ACP note and agree with the history, exam and plan of care, except as noted.

## 2024-12-28 ENCOUNTER — EMERGENCY (EMERGENCY)
Facility: HOSPITAL | Age: 64
LOS: 0 days | Discharge: HOME | End: 2024-12-28
Attending: EMERGENCY MEDICINE
Payer: COMMERCIAL

## 2024-12-28 VITALS
DIASTOLIC BLOOD PRESSURE: 68 MMHG | RESPIRATION RATE: 16 BRPM | OXYGEN SATURATION: 96 % | HEART RATE: 56 BPM | TEMPERATURE: 98 F | SYSTOLIC BLOOD PRESSURE: 109 MMHG

## 2024-12-28 VITALS — WEIGHT: 170.86 LBS

## 2024-12-28 DIAGNOSIS — Z88.8 ALLERGY STATUS TO OTHER DRUGS, MEDICAMENTS AND BIOLOGICAL SUBSTANCES: ICD-10-CM

## 2024-12-28 DIAGNOSIS — J02.9 ACUTE PHARYNGITIS, UNSPECIFIED: ICD-10-CM

## 2024-12-28 DIAGNOSIS — R52 PAIN, UNSPECIFIED: ICD-10-CM

## 2024-12-28 DIAGNOSIS — R74.8 ABNORMAL LEVELS OF OTHER SERUM ENZYMES: ICD-10-CM

## 2024-12-28 DIAGNOSIS — R79.89 OTHER SPECIFIED ABNORMAL FINDINGS OF BLOOD CHEMISTRY: ICD-10-CM

## 2024-12-28 DIAGNOSIS — Z88.5 ALLERGY STATUS TO NARCOTIC AGENT: ICD-10-CM

## 2024-12-28 DIAGNOSIS — F41.9 ANXIETY DISORDER, UNSPECIFIED: ICD-10-CM

## 2024-12-28 LAB
ALBUMIN SERPL ELPH-MCNC: 3.9 G/DL — SIGNIFICANT CHANGE UP (ref 3.3–5)
ALP SERPL-CCNC: 59 U/L — SIGNIFICANT CHANGE UP (ref 40–120)
ALT FLD-CCNC: 22 U/L — SIGNIFICANT CHANGE UP (ref 12–78)
ANION GAP SERPL CALC-SCNC: 5 MMOL/L — SIGNIFICANT CHANGE UP (ref 5–17)
AST SERPL-CCNC: 22 U/L — SIGNIFICANT CHANGE UP (ref 15–37)
BASOPHILS # BLD AUTO: 0.04 K/UL — SIGNIFICANT CHANGE UP (ref 0–0.2)
BASOPHILS NFR BLD AUTO: 0.6 % — SIGNIFICANT CHANGE UP (ref 0–2)
BILIRUB SERPL-MCNC: 0.4 MG/DL — SIGNIFICANT CHANGE UP (ref 0.2–1.2)
BUN SERPL-MCNC: 15 MG/DL — SIGNIFICANT CHANGE UP (ref 7–23)
CALCIUM SERPL-MCNC: 8.4 MG/DL — LOW (ref 8.5–10.1)
CHLORIDE SERPL-SCNC: 99 MMOL/L — SIGNIFICANT CHANGE UP (ref 96–108)
CK SERPL-CCNC: 313 U/L — HIGH (ref 26–308)
CO2 SERPL-SCNC: 30 MMOL/L — SIGNIFICANT CHANGE UP (ref 22–31)
CREAT SERPL-MCNC: 1.37 MG/DL — HIGH (ref 0.5–1.3)
EGFR: 58 ML/MIN/1.73M2 — LOW
EOSINOPHIL # BLD AUTO: 0.1 K/UL — SIGNIFICANT CHANGE UP (ref 0–0.5)
EOSINOPHIL NFR BLD AUTO: 1.5 % — SIGNIFICANT CHANGE UP (ref 0–6)
FLUAV AG NPH QL: SIGNIFICANT CHANGE UP
FLUBV AG NPH QL: SIGNIFICANT CHANGE UP
GLUCOSE SERPL-MCNC: 121 MG/DL — HIGH (ref 70–99)
HCT VFR BLD CALC: 48.1 % — SIGNIFICANT CHANGE UP (ref 39–50)
HGB BLD-MCNC: 16.4 G/DL — SIGNIFICANT CHANGE UP (ref 13–17)
IMM GRANULOCYTES NFR BLD AUTO: 0.3 % — SIGNIFICANT CHANGE UP (ref 0–0.9)
LYMPHOCYTES # BLD AUTO: 1.25 K/UL — SIGNIFICANT CHANGE UP (ref 1–3.3)
LYMPHOCYTES # BLD AUTO: 19.1 % — SIGNIFICANT CHANGE UP (ref 13–44)
MCHC RBC-ENTMCNC: 32 PG — SIGNIFICANT CHANGE UP (ref 27–34)
MCHC RBC-ENTMCNC: 34.1 G/DL — SIGNIFICANT CHANGE UP (ref 32–36)
MCV RBC AUTO: 93.8 FL — SIGNIFICANT CHANGE UP (ref 80–100)
MONOCYTES # BLD AUTO: 0.64 K/UL — SIGNIFICANT CHANGE UP (ref 0–0.9)
MONOCYTES NFR BLD AUTO: 9.8 % — SIGNIFICANT CHANGE UP (ref 2–14)
NEUTROPHILS # BLD AUTO: 4.5 K/UL — SIGNIFICANT CHANGE UP (ref 1.8–7.4)
NEUTROPHILS NFR BLD AUTO: 68.7 % — SIGNIFICANT CHANGE UP (ref 43–77)
PLATELET # BLD AUTO: 207 K/UL — SIGNIFICANT CHANGE UP (ref 150–400)
POTASSIUM SERPL-MCNC: 3.8 MMOL/L — SIGNIFICANT CHANGE UP (ref 3.5–5.3)
POTASSIUM SERPL-SCNC: 3.8 MMOL/L — SIGNIFICANT CHANGE UP (ref 3.5–5.3)
PROT SERPL-MCNC: 7.3 GM/DL — SIGNIFICANT CHANGE UP (ref 6–8.3)
RBC # BLD: 5.13 M/UL — SIGNIFICANT CHANGE UP (ref 4.2–5.8)
RBC # FLD: 13.2 % — SIGNIFICANT CHANGE UP (ref 10.3–14.5)
RSV RNA NPH QL NAA+NON-PROBE: SIGNIFICANT CHANGE UP
SARS-COV-2 RNA SPEC QL NAA+PROBE: SIGNIFICANT CHANGE UP
SODIUM SERPL-SCNC: 134 MMOL/L — LOW (ref 135–145)
WBC # BLD: 6.55 K/UL — SIGNIFICANT CHANGE UP (ref 3.8–10.5)
WBC # FLD AUTO: 6.55 K/UL — SIGNIFICANT CHANGE UP (ref 3.8–10.5)

## 2024-12-28 PROCEDURE — 0241U: CPT

## 2024-12-28 PROCEDURE — 85025 COMPLETE CBC W/AUTO DIFF WBC: CPT

## 2024-12-28 PROCEDURE — 99283 EMERGENCY DEPT VISIT LOW MDM: CPT | Mod: 25

## 2024-12-28 PROCEDURE — 36415 COLL VENOUS BLD VENIPUNCTURE: CPT

## 2024-12-28 PROCEDURE — 82550 ASSAY OF CK (CPK): CPT

## 2024-12-28 PROCEDURE — 99284 EMERGENCY DEPT VISIT MOD MDM: CPT

## 2024-12-28 PROCEDURE — 80053 COMPREHEN METABOLIC PANEL: CPT

## 2024-12-28 PROCEDURE — 36000 PLACE NEEDLE IN VEIN: CPT

## 2024-12-28 RX ORDER — SODIUM CHLORIDE 9 MG/ML
1000 INJECTION, SOLUTION INTRAMUSCULAR; INTRAVENOUS; SUBCUTANEOUS ONCE
Refills: 0 | Status: COMPLETED | OUTPATIENT
Start: 2024-12-28 | End: 2024-12-28

## 2024-12-28 RX ADMIN — SODIUM CHLORIDE 1000 MILLILITER(S): 9 INJECTION, SOLUTION INTRAMUSCULAR; INTRAVENOUS; SUBCUTANEOUS at 03:22

## 2024-12-28 NOTE — ED PROVIDER NOTE - PATIENT PORTAL LINK FT
DISPLAY PLAN FREE TEXT
You can access the FollowMyHealth Patient Portal offered by [facility] by registering at the following website: [email or web address]. By joining [employer]’s FollowMyHealth portal, you will also be able to view your health information using other applications (apps) compatible with our system.

## 2024-12-28 NOTE — ED PROVIDER NOTE - OBJECTIVE STATEMENT
65yo m pmh anxiety, Presents with concern for body aches.  Patient also reports decreased drinking for the last few days.  No fevers or chills.  No cough.  Patient does note he had a sore throat a few days ago.  No recent exercise.

## 2024-12-28 NOTE — ED PROVIDER NOTE - NSFOLLOWUPINSTRUCTIONS_ED_ALL_ED_FT
Please follow-up with your primary doctor.  Return to ED if worsening pain weakness or other concerning symptoms.

## 2024-12-28 NOTE — ED ADULT TRIAGE NOTE - CHIEF COMPLAINT QUOTE
pt. ambulatory into ED complaining of generalized muscle aches and weakness x3 days. Pt. states "I feel weakn and dehydrated". Pt. ambulatory without difficulty. Pt. denies fevers/ chills/ SOB. no meds taken PTA. no sick contacts at home. no PMHx.

## 2024-12-28 NOTE — ED PROVIDER NOTE - CLINICAL SUMMARY MEDICAL DECISION MAKING FREE TEXT BOX
Patient with bodyaches, also sore throat a few days ago will flu swab, will hydrate reassess.    Progress note creatinine and CK mildly elevated and given fluids, patient feels improved okay for discharge.

## 2024-12-28 NOTE — ED ADULT NURSE NOTE - OBJECTIVE STATEMENT
pt 64y male, A&Ox4, breathing unlabored, presents ambulatory into ED c/o generalized muscle aches and weakness x3 days. Pt. states "I feel weak and dehydrated". Pt. denies fevers/ chills/ SOB. no meds taken PTA. no sick contacts at home. no PMHx.

## 2025-01-10 NOTE — ED PROVIDER NOTE - PSH
----- Message from Greyson Browning MD sent at 1/10/2025  1:09 PM CST -----  Regarding: FW: lab order   Usually will like the lab to be done before the patient seeing PCP so it is okay to do  ----- Message -----  From: Neelima Younger CMA  Sent: 1/10/2025  12:41 PM CST  To: Greyson Browning MD  Subject: FW: lab order                                      ----- Message -----  From: Radha Hernandez  Sent: 1/10/2025  12:06 PM CST  To: [facility] Lopez Nurse Msg Pool  Subject: lab order                                        This patient has a lab appointment 01/13/2025. Lab orders not expected until 03/2025. Appointment with provider 02/10/2025. Is it ok to do lab work 01/13/2025? Please let me know or change expected date for lab orders. Thank you.   No significant past surgical history

## 2025-02-25 NOTE — ED PROVIDER NOTE - DISPOSITION TYPE
Patient glucose was 85 this am, patient given 8 oz of apple juice and waiting for breakfast to arrive.    DISCHARGE

## 2025-03-17 ENCOUNTER — EMERGENCY (EMERGENCY)
Facility: HOSPITAL | Age: 65
LOS: 0 days | Discharge: HOME | End: 2025-03-17
Attending: STUDENT IN AN ORGANIZED HEALTH CARE EDUCATION/TRAINING PROGRAM
Payer: COMMERCIAL

## 2025-03-17 VITALS
OXYGEN SATURATION: 98 % | HEART RATE: 63 BPM | DIASTOLIC BLOOD PRESSURE: 81 MMHG | RESPIRATION RATE: 18 BRPM | TEMPERATURE: 98 F | SYSTOLIC BLOOD PRESSURE: 124 MMHG

## 2025-03-17 VITALS
DIASTOLIC BLOOD PRESSURE: 92 MMHG | HEART RATE: 78 BPM | WEIGHT: 167.99 LBS | TEMPERATURE: 98 F | RESPIRATION RATE: 18 BRPM | OXYGEN SATURATION: 95 % | SYSTOLIC BLOOD PRESSURE: 144 MMHG

## 2025-03-17 DIAGNOSIS — F41.0 PANIC DISORDER [EPISODIC PAROXYSMAL ANXIETY]: ICD-10-CM

## 2025-03-17 DIAGNOSIS — Z88.8 ALLERGY STATUS TO OTHER DRUGS, MEDICAMENTS AND BIOLOGICAL SUBSTANCES: ICD-10-CM

## 2025-03-17 DIAGNOSIS — F32.A DEPRESSION, UNSPECIFIED: ICD-10-CM

## 2025-03-17 LAB
ALBUMIN SERPL ELPH-MCNC: 4 G/DL — SIGNIFICANT CHANGE UP (ref 3.3–5)
ALP SERPL-CCNC: 47 U/L — SIGNIFICANT CHANGE UP (ref 40–120)
ALT FLD-CCNC: 21 U/L — SIGNIFICANT CHANGE UP (ref 12–78)
AMPHET UR-MCNC: NEGATIVE — SIGNIFICANT CHANGE UP
ANION GAP SERPL CALC-SCNC: 2 MMOL/L — LOW (ref 5–17)
APAP SERPL-MCNC: <2 UG/ML — LOW (ref 10–30)
AST SERPL-CCNC: 9 U/L — LOW (ref 15–37)
BARBITURATES UR SCN-MCNC: NEGATIVE — SIGNIFICANT CHANGE UP
BASOPHILS # BLD AUTO: 0.04 K/UL — SIGNIFICANT CHANGE UP (ref 0–0.2)
BASOPHILS NFR BLD AUTO: 0.8 % — SIGNIFICANT CHANGE UP (ref 0–2)
BENZODIAZ UR-MCNC: NEGATIVE — SIGNIFICANT CHANGE UP
BILIRUB SERPL-MCNC: 0.6 MG/DL — SIGNIFICANT CHANGE UP (ref 0.2–1.2)
BUN SERPL-MCNC: 11 MG/DL — SIGNIFICANT CHANGE UP (ref 7–23)
CALCIUM SERPL-MCNC: 9.2 MG/DL — SIGNIFICANT CHANGE UP (ref 8.5–10.1)
CHLORIDE SERPL-SCNC: 100 MMOL/L — SIGNIFICANT CHANGE UP (ref 96–108)
CO2 SERPL-SCNC: 33 MMOL/L — HIGH (ref 22–31)
COCAINE METAB.OTHER UR-MCNC: NEGATIVE — SIGNIFICANT CHANGE UP
CREAT SERPL-MCNC: 1.19 MG/DL — SIGNIFICANT CHANGE UP (ref 0.5–1.3)
D DIMER BLD IA.RAPID-MCNC: 887 NG/ML DDU — HIGH
EGFR: 69 ML/MIN/1.73M2 — SIGNIFICANT CHANGE UP
EGFR: 69 ML/MIN/1.73M2 — SIGNIFICANT CHANGE UP
EOSINOPHIL # BLD AUTO: 0.11 K/UL — SIGNIFICANT CHANGE UP (ref 0–0.5)
EOSINOPHIL NFR BLD AUTO: 2.3 % — SIGNIFICANT CHANGE UP (ref 0–6)
ETHANOL SERPL-MCNC: <10 MG/DL — SIGNIFICANT CHANGE UP (ref 0–10)
FENTANYL UR QL SCN: NEGATIVE — SIGNIFICANT CHANGE UP
FLUAV AG NPH QL: SIGNIFICANT CHANGE UP
FLUBV AG NPH QL: SIGNIFICANT CHANGE UP
GLUCOSE SERPL-MCNC: 100 MG/DL — HIGH (ref 70–99)
HCT VFR BLD CALC: 46.1 % — SIGNIFICANT CHANGE UP (ref 39–50)
HGB BLD-MCNC: 15.8 G/DL — SIGNIFICANT CHANGE UP (ref 13–17)
IMM GRANULOCYTES # BLD AUTO: 0 K/UL — SIGNIFICANT CHANGE UP (ref 0–0.07)
IMM GRANULOCYTES NFR BLD AUTO: 0 % — SIGNIFICANT CHANGE UP (ref 0–0.9)
LYMPHOCYTES # BLD AUTO: 1.37 K/UL — SIGNIFICANT CHANGE UP (ref 1–3.3)
LYMPHOCYTES NFR BLD AUTO: 28.8 % — SIGNIFICANT CHANGE UP (ref 13–44)
MCHC RBC-ENTMCNC: 32 PG — SIGNIFICANT CHANGE UP (ref 27–34)
MCHC RBC-ENTMCNC: 34.3 G/DL — SIGNIFICANT CHANGE UP (ref 32–36)
MCV RBC AUTO: 93.3 FL — SIGNIFICANT CHANGE UP (ref 80–100)
METHADONE UR-MCNC: NEGATIVE — SIGNIFICANT CHANGE UP
MONOCYTES # BLD AUTO: 0.6 K/UL — SIGNIFICANT CHANGE UP (ref 0–0.9)
MONOCYTES NFR BLD AUTO: 12.6 % — SIGNIFICANT CHANGE UP (ref 2–14)
NEUTROPHILS # BLD AUTO: 2.64 K/UL — SIGNIFICANT CHANGE UP (ref 1.8–7.4)
NEUTROPHILS NFR BLD AUTO: 55.5 % — SIGNIFICANT CHANGE UP (ref 43–77)
NRBC # BLD AUTO: 0 K/UL — SIGNIFICANT CHANGE UP (ref 0–0)
NRBC # FLD: 0 K/UL — SIGNIFICANT CHANGE UP (ref 0–0)
NRBC BLD AUTO-RTO: 0 /100 WBCS — SIGNIFICANT CHANGE UP (ref 0–0)
OPIATES UR-MCNC: NEGATIVE — SIGNIFICANT CHANGE UP
PCP SPEC-MCNC: SIGNIFICANT CHANGE UP
PCP UR-MCNC: NEGATIVE — SIGNIFICANT CHANGE UP
PLATELET # BLD AUTO: 216 K/UL — SIGNIFICANT CHANGE UP (ref 150–400)
PMV BLD: 8.8 FL — SIGNIFICANT CHANGE UP (ref 7–13)
POTASSIUM SERPL-MCNC: 4.2 MMOL/L — SIGNIFICANT CHANGE UP (ref 3.5–5.3)
POTASSIUM SERPL-SCNC: 4.2 MMOL/L — SIGNIFICANT CHANGE UP (ref 3.5–5.3)
PROT SERPL-MCNC: 7.3 GM/DL — SIGNIFICANT CHANGE UP (ref 6–8.3)
RBC # BLD: 4.94 M/UL — SIGNIFICANT CHANGE UP (ref 4.2–5.8)
RBC # FLD: 12.9 % — SIGNIFICANT CHANGE UP (ref 10.3–14.5)
RSV RNA NPH QL NAA+NON-PROBE: SIGNIFICANT CHANGE UP
SALICYLATES SERPL-MCNC: <1.7 MG/DL — LOW (ref 2.8–20)
SARS-COV-2 RNA SPEC QL NAA+PROBE: SIGNIFICANT CHANGE UP
SODIUM SERPL-SCNC: 135 MMOL/L — SIGNIFICANT CHANGE UP (ref 135–145)
THC UR QL: NEGATIVE — SIGNIFICANT CHANGE UP
TROPONIN I, HIGH SENSITIVITY RESULT: 6.45 NG/L — SIGNIFICANT CHANGE UP
TSH SERPL-MCNC: 2.22 UU/ML — SIGNIFICANT CHANGE UP (ref 0.34–4.82)
WBC # BLD: 4.76 K/UL — SIGNIFICANT CHANGE UP (ref 3.8–10.5)
WBC # FLD AUTO: 4.76 K/UL — SIGNIFICANT CHANGE UP (ref 3.8–10.5)

## 2025-03-17 PROCEDURE — 80307 DRUG TEST PRSMV CHEM ANLYZR: CPT

## 2025-03-17 PROCEDURE — 99285 EMERGENCY DEPT VISIT HI MDM: CPT

## 2025-03-17 PROCEDURE — 99285 EMERGENCY DEPT VISIT HI MDM: CPT | Mod: 25

## 2025-03-17 PROCEDURE — 85379 FIBRIN DEGRADATION QUANT: CPT

## 2025-03-17 PROCEDURE — 70450 CT HEAD/BRAIN W/O DYE: CPT | Mod: MC

## 2025-03-17 PROCEDURE — 84484 ASSAY OF TROPONIN QUANT: CPT

## 2025-03-17 PROCEDURE — 36415 COLL VENOUS BLD VENIPUNCTURE: CPT

## 2025-03-17 PROCEDURE — 0241U: CPT

## 2025-03-17 PROCEDURE — 85025 COMPLETE CBC W/AUTO DIFF WBC: CPT

## 2025-03-17 PROCEDURE — 70450 CT HEAD/BRAIN W/O DYE: CPT | Mod: 26

## 2025-03-17 PROCEDURE — 99283 EMERGENCY DEPT VISIT LOW MDM: CPT

## 2025-03-17 PROCEDURE — 93005 ELECTROCARDIOGRAM TRACING: CPT

## 2025-03-17 PROCEDURE — 93010 ELECTROCARDIOGRAM REPORT: CPT

## 2025-03-17 PROCEDURE — 71275 CT ANGIOGRAPHY CHEST: CPT | Mod: 26

## 2025-03-17 PROCEDURE — 71275 CT ANGIOGRAPHY CHEST: CPT | Mod: MC

## 2025-03-17 PROCEDURE — 84443 ASSAY THYROID STIM HORMONE: CPT

## 2025-03-17 PROCEDURE — 80053 COMPREHEN METABOLIC PANEL: CPT

## 2025-03-17 RX ORDER — ALPRAZOLAM 0.5 MG
1 TABLET, EXTENDED RELEASE 24 HR ORAL
Qty: 14 | Refills: 0
Start: 2025-03-17 | End: 2025-03-23

## 2025-03-17 RX ORDER — ALPRAZOLAM 0.5 MG
0.5 TABLET, EXTENDED RELEASE 24 HR ORAL ONCE
Refills: 0 | Status: DISCONTINUED | OUTPATIENT
Start: 2025-03-17 | End: 2025-03-17

## 2025-03-17 RX ADMIN — Medication 0.5 MILLIGRAM(S): at 13:53

## 2025-03-17 NOTE — ED PROVIDER NOTE - PHYSICAL EXAMINATION
GENERAL: A&Ox4, non-toxic appearing, no acute distress  HEENT: NCAT, EOMI, oral mucosa moist, normal conjunctiva  RESP: CTAB, no respiratory distress, no wheezes/rhonchi/rales, speaking in full sentences  CV: RRR, no murmurs/rubs/gallops  MSK: no visible deformities  NEURO: no focal sensory or motor deficits, CN 2-12 grossly intact  SKIN: warm, normal color, well perfused, no rash  PSYCH: anxious appearing

## 2025-03-17 NOTE — ED BEHAVIORAL HEALTH ASSESSMENT NOTE - NSBHMSEATTEN_PSY_A_CORE
Writer attempted to follow up with pt regarding community CM services. Pt appeared to be in group. Writer to follow up at later time/date for YAO to be obtained for CM collateral.     MADI Griffin, Discharge Coordinator  10/10/2024   Normal

## 2025-03-17 NOTE — ED PROVIDER NOTE - PATIENT PORTAL LINK FT
You can access the FollowMyHealth Patient Portal offered by [facility] by registering at the following website: [email or web address]. By joining [email or web address]’s FollowMyHealth portal, you will also be able to view your health information using other applications (apps) compatible with our system.

## 2025-03-17 NOTE — ED ADULT TRIAGE NOTE - CHIEF COMPLAINT QUOTE
pt presenting with anxiety,  HX of same and is on meds. His DrEvan recently changed them around and yesterday pt "felt like he was going to die", with SOB, jaw pain and facial/eye pain and pressure. Pt denies SI HI. Pt states he's on a lot of meds, his Dr. thinks it might be interactions with the new anxiety meds

## 2025-03-17 NOTE — ED BEHAVIORAL HEALTH ASSESSMENT NOTE - HPI (INCLUDE ILLNESS QUALITY, SEVERITY, DURATION, TIMING, CONTEXT, MODIFYING FACTORS, ASSOCIATED SIGNS AND SYMPTOMS)
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      Patient in bed AAOX3, endorses that he is under care of Dr. Cui (Psychiatrist), who has been prescribing meds for depression and anxiety. He endorses that he takes Zoloft 100 mg daily with Wellbutrin 150, most recently increased to Wellbutrin  mg daily  2 days earlier and recently stopped Remeron 7.5 mg HS as he gained weight and in addition also takes Lamictal 75 mg daily with Buprenorphine 8 mg/day. He endorses that he had one panic attack last night with SOB, tremors and felt like dying, and to day AM after he woke up he felt another bolt of SOB with associated tremors, dizziness etc. so he decided not to take meds and decided to come to ED for assistance. he has low vitamin-D along  with low Testosterone so he takes Vit-D with bi weekly Testosterone shots. He recently in May' 2024 had Decompression Cranial surgery for Trigeminal Neuralgia. He denied A/V/H or paranoid beliefs, He denied S/H/I/P.

## 2025-03-17 NOTE — ED BEHAVIORAL HEALTH ASSESSMENT NOTE - SUMMARY
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      Patient in bed AAOX3, endorses that he is under care of Dr. Cui (Psychiatrist), who has been prescribing meds for depression and anxiety. He endorses that he takes Zoloft 100 mg daily with Wellbutrin 150, most recently increased to Wellbutrin  mg daily  2 days earlier and recently stopped Remeron 7.5 mg HS as he gained weight and in addition also takes Lamictal 75 mg daily with Buprenorphine 8 mg/day. He endorses that he had one panic attack last night with SOB, tremors and felt like dying, and to day AM after he woke up he felt another bolt of SOB with associated tremors, dizziness etc. so he decided not to take meds and decided to come to ED for assistance. he has low vitamin-D along  with low Testosterone so he takes Vit-D with bi weekly Testosterone shots. He recently in May' 2024 had Decompression Cranial surgery for Trigeminal Neuralgia. He denied A/V/H or paranoid beliefs, He denied S/H/I/P.    Plan: To re-start Zoloft 50 mg starting tomorrow             To stay off from Wellbutrin for few days             To hold off Lamictal 75 mg daily--For mac rash             F/U with Dr. Cui             Avoid carbonated drinks              Xanax 0.5 mg BID--7 days

## 2025-03-17 NOTE — ED ADULT NURSE NOTE - OBJECTIVE STATEMENT
Patient is a 65y old male, alert and oriented X3, presenting to the ER with c/o anxiety. Patient reports, "A week ago Wednesday they had me eric off Zoloft and Mirtazepine, as well as started me on Wellbutrin and Lamictal. Sunday and today I started walking into the walls. For the past 6 days I have been experiencing dizzy spells, doctor See was aware. I underwent major brain surgery in May of 2024 for Trigeminal naralga."  Pt denies CP, nausea, vomiting, visual changes, auditory or visual hallucinations, suicidal or homicidal thoughts.   EKG completed.

## 2025-03-17 NOTE — ED PROVIDER NOTE - PROGRESS NOTE DETAILS
Labs personally reviewed.  Labs significant for elevated D-dimer of 887, troponin normal.  Discussed results with patient and all questions answered, recommended to obtain CTA which patient is agreeable to.  Orders placed.  Patient seen by psychiatry and cleared for discharge, advised to discontinue his anxiety medications.  Patient has a plan for home, will return and has a friend's house he can go to if he feels unsafe.  Dispo pending CTA result. CTA negative for PE.  Results discussed with patient and all questions answered.  Patient feels comfortable going home.  He will discontinue medications as per psychiatry.  Discussed return precautions and all questions answered. Pt in agreement w/ plan. Patient demonstrates decision making capacity, NAD, VSS. Stable for d/c.

## 2025-03-17 NOTE — ED ADULT NURSE NOTE - AFFECT QUALITY
Mode: repeat paint Fluence #1 (J/Cm2 Or Mj/Cm2): 700 Detail Level: Detailed Post-Care Instructions: I reviewed with the patient in detail post-care instructions. Patient should stay away from the sun and wear sun protection until treated areas are fully healed. Treatment Number: 12 Comments: Increased by 5% today. Treatment #12, previous miscount. Total Square Area In Cm2 (Required For Proper Billing- Whole Numbers Only Please): 132 Consent: Written consent obtained, risks reviewed including but not limited to crusting, scabbing, blistering, scarring, darker or lighter pigmentary change, incidental hair removal, bruising, and/or incomplete removal. Location #1: scalp Spot Size: 2 x 2 cm Fluence Units: J/cm2 Anxious

## 2025-03-17 NOTE — ED ADULT NURSE NOTE - CHIEF COMPLAINT
"Anesthesia Transfer of Care Note    Patient: Darrel Lepe Jr.    Procedure(s) Performed: Procedure(s) (LRB):  Injection,steroid,epidural,transforaminal approach (Bilateral)    Patient location: OPS    Anesthesia Type: MAC    Transport from OR: Transported from OR on room air with adequate spontaneous ventilation    Post pain: adequate analgesia    Post assessment: no apparent anesthetic complications    Post vital signs: stable    Level of consciousness: awake and alert    Nausea/Vomiting: no nausea/vomiting    Complications: none    Transfer of care protocol was followed      Last vitals: Visit Vitals  BP (!) 142/80 (BP Location: Left arm, Patient Position: Lying)   Pulse 71   Temp 36.5 °C (97.7 °F) (Tympanic)   Resp 20   Ht 6' 1" (1.854 m)   Wt 91.8 kg (202 lb 6.1 oz)   SpO2 96%   BMI 26.70 kg/m²     "
The patient is a 65y Male complaining of

## 2025-03-17 NOTE — ED ADULT NURSE NOTE - NSFALLRISKINTERV_ED_ALL_ED

## 2025-03-17 NOTE — ED PROVIDER NOTE - OBJECTIVE STATEMENT
65-year-old male PMH cluster headaches, anxiety/panic disorder, presents to the emergency department for anxiety.  Patient states he had an attack on Saturday, felt debilitated with pressure around the head and face and significant shortness of breath.  Patient was able to walk around in order to calm his symptoms.  He was told by his psychiatrist Dr. Cui this may be due to multiple recent medication changes.  Patient had additional episode today around 4 AM, also improved with ambulation.  No history of severe disabling panic disorder such as this, gets generalized anxiety when in public.  He denies SI, HI, or AVH.  No recreational drug use.    Medication reconciliation:  Sertraline 100 mg daily started 9/24/2024  bupropion 150 mg ER started 3/5/2025  clonazepam 0.5 Mg 3 times daily started 10/6/2024  lamotrigine 25 mg uptitrated to 75 mg started 2/17/2025 mirtazapine 7.5 mg daily started 9/25/2024  buprenorphine 8 mg started 1/15/25 65-year-old male PMH cluster headaches, anxiety/panic disorder, presents to the emergency department for anxiety.  Patient states he had an attack on Saturday, felt debilitated with pressure around the head and face and significant shortness of breath.  Patient was able to walk around in order to calm his symptoms.  He was told by his psychiatrist Dr. Cui this may be due to multiple recent medication changes.  Patient had additional episode today around 4 AM, also improved with ambulation.  No history of severe disabling panic disorder such as this, gets generalized anxiety when in public.  He denies SI, HI, or AVH.  No recreational drug use.    Medication reconciliation:  Sertraline 100 mg daily started 9/24/2024  bupropion 150 mg ER started 3/5/2025  clonazepam 0.5 Mg 3 times daily started 10/6/2024  lamotrigine 25 mg uptitrated to 75 mg started 2/17/2025   mirtazapine 7.5 mg daily started 9/25/2024  buprenorphine 8 mg started 1/15/25

## 2025-03-17 NOTE — ED ADULT NURSE NOTE - CADM POA URETHRAL CATHETER
Copied from CRM #[id number]. Topic: MW Messaging - MW Patient Request  >> Feb 6, 2025  2:01 PM Nica SNYDER wrote:  Ladan called requesting to send a general message to clinician.   Verified issue is NOT regarding a symptom(s) requiring routine or emergent triage. Verified another message template type and CRM does not apply.    Selected 'Wrap Up CRM' and created new Telephone Encounter after clicking 'Convert to Clinical Call'. Selected appropriate Reason for Call.  Sent Pt message template and routed as routine priority per Clinician KB page to appropriate clinician pool. Readback full message.-- DO NOT REPLY / DO NOT REPLY ALL --  -- This inbox is not monitored. If this was sent to the wrong provider or department, reroute message to P ECO Reroute pool. --  -- Message is from Engagement Center Operations ([facility]) --    General Patient Message: Patient is returning office's call. Please call back to discuss results at earliest availability thank you.    Caller Information       Contact Date/Time Type Contact Phone/Fax    02/06/2025 01:57 PM CST Phone (Incoming) Ladan [phone]            Alternative phone number: no     Can a detailed message be left? Yes - Voicemail   Patient has been advised the message will be addressed within 2-3 business days.                 No

## 2025-03-17 NOTE — ED PROVIDER NOTE - CLINICAL SUMMARY MEDICAL DECISION MAKING FREE TEXT BOX
65-year-old male presenting with progressive anxiety disorder with multiple recent medication changes.  Appears anxious and slightly tremulous, no other significant physical exam findings.  Heart and lungs normal.  Plan for medical clearance including labs and CT head, psych consult, offered and accepted Xanax for symptoms.

## 2025-03-17 NOTE — ED PROVIDER NOTE - NSFOLLOWUPINSTRUCTIONS_ED_ALL_ED_FT
You were seen in the emergency department for episodes of anxiety/panic attack.  Please change outpatient medications as instructed by psychiatry team.  Please follow-up with your primary psychiatrist, Dr. Cui for further medication management.    Rest, drink plenty of fluids.  Advance activity as tolerated.  Continue all previously prescribed medications as directed.  Follow up with your primary care physician in 48-72 hours- bring copies of your results.  Return to the ER for worsening or persistent symptoms, and/or ANY NEW OR CONCERNING SYMPTOMS. If you have issues obtaining follow up, please call: [phone] to obtain a doctor or specialist who takes your insurance in your area.

## 2025-03-17 NOTE — ED STATDOCS - PROGRESS NOTE DETAILS
65-year-old male presents to the emergency department for severe anxiety panic attack shortness of breath dizziness.  States that symptoms started few days ago states he thinks he is being weaned off his Zoloft too quickly.  States this morning woke up at 4 AM and could not sit still and started walking at 4 AM now he is tearful we will send to [facility] for further workup and evaluation. Reinaldo Boateng M.D., Attending Physician

## 2025-04-05 ENCOUNTER — EMERGENCY (EMERGENCY)
Facility: HOSPITAL | Age: 65
LOS: 0 days | Discharge: HOME | End: 2025-04-05
Attending: STUDENT IN AN ORGANIZED HEALTH CARE EDUCATION/TRAINING PROGRAM
Payer: COMMERCIAL

## 2025-04-05 VITALS
OXYGEN SATURATION: 98 % | RESPIRATION RATE: 20 BRPM | SYSTOLIC BLOOD PRESSURE: 126 MMHG | TEMPERATURE: 98 F | DIASTOLIC BLOOD PRESSURE: 78 MMHG | HEART RATE: 86 BPM

## 2025-04-05 VITALS
RESPIRATION RATE: 22 BRPM | DIASTOLIC BLOOD PRESSURE: 97 MMHG | OXYGEN SATURATION: 99 % | SYSTOLIC BLOOD PRESSURE: 146 MMHG | HEART RATE: 104 BPM | WEIGHT: 169.54 LBS | TEMPERATURE: 98 F

## 2025-04-05 DIAGNOSIS — R11.10 VOMITING, UNSPECIFIED: ICD-10-CM

## 2025-04-05 DIAGNOSIS — I45.10 UNSPECIFIED RIGHT BUNDLE-BRANCH BLOCK: ICD-10-CM

## 2025-04-05 DIAGNOSIS — F41.9 ANXIETY DISORDER, UNSPECIFIED: ICD-10-CM

## 2025-04-05 DIAGNOSIS — R00.0 TACHYCARDIA, UNSPECIFIED: ICD-10-CM

## 2025-04-05 DIAGNOSIS — R10.9 UNSPECIFIED ABDOMINAL PAIN: ICD-10-CM

## 2025-04-05 DIAGNOSIS — Z88.8 ALLERGY STATUS TO OTHER DRUGS, MEDICAMENTS AND BIOLOGICAL SUBSTANCES: ICD-10-CM

## 2025-04-05 LAB
ALP SERPL-CCNC: 56 U/L — SIGNIFICANT CHANGE UP (ref 40–120)
ALT FLD-CCNC: 34 U/L — SIGNIFICANT CHANGE UP (ref 12–78)
ANION GAP SERPL CALC-SCNC: 4 MMOL/L — LOW (ref 5–17)
AST SERPL-CCNC: 17 U/L — SIGNIFICANT CHANGE UP (ref 15–37)
BASOPHILS # BLD AUTO: 0.03 K/UL — SIGNIFICANT CHANGE UP (ref 0–0.2)
BASOPHILS NFR BLD AUTO: 0.3 % — SIGNIFICANT CHANGE UP (ref 0–2)
BILIRUB SERPL-MCNC: 0.7 MG/DL — SIGNIFICANT CHANGE UP (ref 0.2–1.2)
BUN SERPL-MCNC: 15 MG/DL — SIGNIFICANT CHANGE UP (ref 7–23)
CALCIUM SERPL-MCNC: 9.8 MG/DL — SIGNIFICANT CHANGE UP (ref 8.5–10.1)
CHLORIDE SERPL-SCNC: 101 MMOL/L — SIGNIFICANT CHANGE UP (ref 96–108)
CO2 SERPL-SCNC: 30 MMOL/L — SIGNIFICANT CHANGE UP (ref 22–31)
EGFR: 60 ML/MIN/1.73M2 — SIGNIFICANT CHANGE UP
EGFR: 60 ML/MIN/1.73M2 — SIGNIFICANT CHANGE UP
EOSINOPHIL # BLD AUTO: 0.02 K/UL — SIGNIFICANT CHANGE UP (ref 0–0.5)
EOSINOPHIL NFR BLD AUTO: 0.2 % — SIGNIFICANT CHANGE UP (ref 0–6)
GLUCOSE SERPL-MCNC: 128 MG/DL — HIGH (ref 70–99)
HCT VFR BLD CALC: 50.5 % — HIGH (ref 39–50)
HGB BLD-MCNC: 17.8 G/DL — HIGH (ref 13–17)
IMM GRANULOCYTES # BLD AUTO: 0.03 K/UL — SIGNIFICANT CHANGE UP (ref 0–0.07)
IMM GRANULOCYTES NFR BLD AUTO: 0.3 % — SIGNIFICANT CHANGE UP (ref 0–0.9)
LIDOCAIN IGE QN: 33 U/L — SIGNIFICANT CHANGE UP (ref 13–75)
LYMPHOCYTES # BLD AUTO: 1.09 K/UL — SIGNIFICANT CHANGE UP (ref 1–3.3)
LYMPHOCYTES NFR BLD AUTO: 11.6 % — LOW (ref 13–44)
MAGNESIUM SERPL-MCNC: 2 MG/DL — SIGNIFICANT CHANGE UP (ref 1.6–2.6)
MCHC RBC-ENTMCNC: 31.7 PG — SIGNIFICANT CHANGE UP (ref 27–34)
MCHC RBC-ENTMCNC: 35.2 G/DL — SIGNIFICANT CHANGE UP (ref 32–36)
MCV RBC AUTO: 90 FL — SIGNIFICANT CHANGE UP (ref 80–100)
MONOCYTES # BLD AUTO: 0.31 K/UL — SIGNIFICANT CHANGE UP (ref 0–0.9)
MONOCYTES NFR BLD AUTO: 3.3 % — SIGNIFICANT CHANGE UP (ref 2–14)
NEUTROPHILS # BLD AUTO: 7.91 K/UL — HIGH (ref 1.8–7.4)
NEUTROPHILS NFR BLD AUTO: 84.3 % — HIGH (ref 43–77)
NRBC # BLD AUTO: 0 K/UL — SIGNIFICANT CHANGE UP (ref 0–0)
NRBC # FLD: 0 K/UL — SIGNIFICANT CHANGE UP (ref 0–0)
NRBC BLD AUTO-RTO: 0 /100 WBCS — SIGNIFICANT CHANGE UP (ref 0–0)
PLATELET # BLD AUTO: 238 K/UL — SIGNIFICANT CHANGE UP (ref 150–400)
PMV BLD: 8.9 FL — SIGNIFICANT CHANGE UP (ref 7–13)
POTASSIUM SERPL-MCNC: 3.5 MMOL/L — SIGNIFICANT CHANGE UP (ref 3.5–5.3)
POTASSIUM SERPL-SCNC: 3.5 MMOL/L — SIGNIFICANT CHANGE UP (ref 3.5–5.3)
PROT SERPL-MCNC: 7.8 GM/DL — SIGNIFICANT CHANGE UP (ref 6–8.3)
RBC # BLD: 5.61 M/UL — SIGNIFICANT CHANGE UP (ref 4.2–5.8)
RBC # FLD: 12.5 % — SIGNIFICANT CHANGE UP (ref 10.3–14.5)
SODIUM SERPL-SCNC: 135 MMOL/L — SIGNIFICANT CHANGE UP (ref 135–145)
WBC # BLD: 9.39 K/UL — SIGNIFICANT CHANGE UP (ref 3.8–10.5)
WBC # FLD AUTO: 9.39 K/UL — SIGNIFICANT CHANGE UP (ref 3.8–10.5)

## 2025-04-05 PROCEDURE — 85025 COMPLETE CBC W/AUTO DIFF WBC: CPT

## 2025-04-05 PROCEDURE — 83690 ASSAY OF LIPASE: CPT

## 2025-04-05 PROCEDURE — 83735 ASSAY OF MAGNESIUM: CPT

## 2025-04-05 PROCEDURE — 93010 ELECTROCARDIOGRAM REPORT: CPT

## 2025-04-05 PROCEDURE — 74177 CT ABD & PELVIS W/CONTRAST: CPT | Mod: MC

## 2025-04-05 PROCEDURE — 36415 COLL VENOUS BLD VENIPUNCTURE: CPT

## 2025-04-05 PROCEDURE — 99285 EMERGENCY DEPT VISIT HI MDM: CPT | Mod: 25

## 2025-04-05 PROCEDURE — 74177 CT ABD & PELVIS W/CONTRAST: CPT | Mod: 26

## 2025-04-05 PROCEDURE — 99285 EMERGENCY DEPT VISIT HI MDM: CPT

## 2025-04-05 PROCEDURE — 96375 TX/PRO/DX INJ NEW DRUG ADDON: CPT

## 2025-04-05 PROCEDURE — 93005 ELECTROCARDIOGRAM TRACING: CPT

## 2025-04-05 PROCEDURE — 80053 COMPREHEN METABOLIC PANEL: CPT

## 2025-04-05 PROCEDURE — 96374 THER/PROPH/DIAG INJ IV PUSH: CPT | Mod: XU

## 2025-04-05 RX ORDER — ACETAMINOPHEN 500 MG/5ML
1000 LIQUID (ML) ORAL ONCE
Refills: 0 | Status: COMPLETED | OUTPATIENT
Start: 2025-04-05 | End: 2025-04-05

## 2025-04-05 RX ORDER — ONDANSETRON HCL/PF 4 MG/2 ML
1 VIAL (ML) INJECTION
Qty: 9 | Refills: 0
Start: 2025-04-05 | End: 2025-04-07

## 2025-04-05 RX ORDER — HALOPERIDOL 10 MG/1
1 TABLET ORAL ONCE
Refills: 0 | Status: COMPLETED | OUTPATIENT
Start: 2025-04-05 | End: 2025-04-05

## 2025-04-05 RX ORDER — CLONAZEPAM 0.5 MG/1
0.5 TABLET ORAL ONCE
Refills: 0 | Status: DISCONTINUED | OUTPATIENT
Start: 2025-04-05 | End: 2025-04-05

## 2025-04-05 RX ADMIN — Medication 20 MILLIGRAM(S): at 07:59

## 2025-04-05 RX ADMIN — Medication 400 MILLIGRAM(S): at 07:59

## 2025-04-05 RX ADMIN — HALOPERIDOL 1 MILLIGRAM(S): 10 TABLET ORAL at 06:25

## 2025-04-05 RX ADMIN — CLONAZEPAM 0.5 MILLIGRAM(S): 0.5 TABLET ORAL at 08:46

## 2025-04-05 RX ADMIN — Medication 2000 MILLILITER(S): at 06:26

## 2025-04-05 NOTE — ED PROVIDER NOTE - CLINICAL SUMMARY MEDICAL DECISION MAKING FREE TEXT BOX
65-year-old male with history of anxiety reporting diffuse abdominal pain, vomiting since this morning.  Patient reports increasing anxiety attacks over the last couple weeks.  Patient appears moderately anxious upon arrival to the emergency department.   CT ab pelvis rule out acute intra-abdominal pathology, basic labs, screening EKG pain control and reeval closely.

## 2025-04-05 NOTE — ED PROVIDER NOTE - NSFOLLOWUPINSTRUCTIONS_ED_ALL_ED_FT
Acute Abdominal Pain    WHAT YOU NEED TO KNOW:    The cause of your abdominal pain may not be found. If a cause is found, treatment will depend on what the cause is.     DISCHARGE INSTRUCTIONS:    Return to the emergency department if:     You vomit blood or cannot stop vomiting.      You have blood in your bowel movement or it looks like tar.       You have bleeding from your rectum.       Your abdomen is larger than usual, more painful, and hard.       You have severe pain in your abdomen.       You stop passing gas and having bowel movements.       You feel weak, dizzy, or faint.    Contact your healthcare provider if:     You have a fever.      You have new signs and symptoms.      Your symptoms do not get better with treatment.       You have questions or concerns about your condition or care.    Medicines may be given to decrease pain, treat an infection, and manage your symptoms. Take your medicine as directed. Call your healthcare provider if you think your medicine is not helping or if you have side effects. Tell him if you are allergic to any medicine. Keep a list of the medicines, vitamins, and herbs you take. Include the amounts, and when and why you take them. Bring the list or the pill bottles to follow-up visits. Carry your medicine list with you in case of an emergency.    Manage your symptoms:     Apply heat on your abdomen for 20 to 30 minutes every 2 hours for as many days as directed. Heat helps decrease pain and muscle spasms.       Manage your stress. Stress may cause abdominal pain. Your healthcare provider may recommend relaxation techniques and deep breathing exercises to help decrease your stress. Your healthcare provider may recommend you talk to someone about your stress or anxiety, such as a counselor or a trusted friend. Get plenty of sleep and exercise regularly.       Limit or do not drink alcohol. Alcohol can make your abdominal pain worse. Ask your healthcare provider if it is safe for you to drink alcohol. Also ask how much is safe for you to drink.       Do not smoke. Nicotine and other chemicals in cigarettes can damage your esophagus and stomach. Ask your healthcare provider for information if you currently smoke and need help to quit. E-cigarettes or smokeless tobacco still contain nicotine. Talk to your healthcare provider before you use these products.     Make changes to the food you eat as directed: Do not eat foods that cause abdominal pain or other symptoms. Eat small meals more often.     Eat more high-fiber foods if you are constipated. High-fiber foods include fruits, vegetables, whole-grain foods, and legumes.       Do not eat foods that cause gas if you have bloating. Examples include broccoli, cabbage, and cauliflower. Do not drink soda or carbonated drinks, because these may also cause gas.       Do not eat foods or drinks that contain sorbitol or fructose if you have diarrhea and bloating. Some examples are fruit juices, candy, jelly, and sugar-free gum.       Do not eat high-fat foods, such as fried foods, cheeseburgers, hot dogs, and desserts.      Limit or do not drink caffeine. Caffeine may make symptoms, such as heart burn or nausea, worse.       Drink plenty of liquids to prevent dehydration from diarrhea or vomiting. Ask your healthcare provider how much liquid to drink each day and which liquids are best for you.     Follow up with your healthcare provider as directed: Write down your questions so you remember to ask them during your visits.      Anxiety    WHAT YOU NEED TO KNOW:    Anxiety is a condition that causes you to feel extremely worried or nervous. The feelings are so strong that they can cause problems with your daily activities or sleep. Anxiety may be triggered by something you fear, or it may happen without a cause. Family or work stress, smoking, caffeine, and alcohol can increase your risk for anxiety. Certain medicines or health conditions can also increase your risk. Anxiety can become a long-term condition if it is not managed or treated.     DISCHARGE INSTRUCTIONS:    Call 911 if:     You have chest pain, tightness, or heaviness that may spread to your shoulders, arms, jaw, neck, or back.      You feel like hurting yourself or someone else.     Contact your healthcare provider if:     Your symptoms get worse or do not get better with treatment.      Your anxiety keeps you from doing your regular daily activities.      You have new symptoms since your last visit.      You have questions or concerns about your condition or care.    Medicines:     Medicines may be given to help you feel more calm and relaxed, and decrease your symptoms.      Take your medicine as directed. Contact your healthcare provider if you think your medicine is not helping or if you have side effects. Tell him of her if you are allergic to any medicine. Keep a list of the medicines, vitamins, and herbs you take. Include the amounts, and when and why you take them. Bring the list or the pill bottles to follow-up visits. Carry your medicine list with you in case of an emergency.    Follow up with your healthcare provider within 2 weeks or as directed: Write down your questions so you remember to ask them during your visits.    Manage anxiety:     Talk to someone about your anxiety. Your healthcare provider may suggest counseling. Cognitive behavioral therapy can help you understand and change how you react to events that trigger your symptoms. You might feel more comfortable talking with a friend or family member about your anxiety. Choose someone you know will be supportive and encouraging.      Find ways to relax. Activities such as exercise, meditation, or listening to music can help you relax. Spend time with friends, or do things you enjoy.      Practice deep breathing. Deep breathing can help you relax when you feel anxious. Focus on taking slow, deep breaths several times a day, or during an anxiety attack. Breathe in through your nose and out through your mouth.       Create a regular sleep routine. Regular sleep can help you feel calmer during the day. Go to sleep and wake up at the same times every day. Do not watch television or use the computer right before bed. Your room should be comfortable, dark, and quiet.       Eat a variety of healthy foods. Healthy foods include fruits, vegetables, low-fat dairy products, lean meats, fish, whole-grain breads, and cooked beans. Healthy foods can help you feel less anxious and have more energy.      Exercise regularly. Exercise can increase your energy level. Exercise may also lift your mood and help you sleep better. Your healthcare provider can help you create an exercise plan.      Do not smoke. Nicotine and other chemicals in cigarettes and cigars can increase anxiety. Ask your healthcare provider for information if you currently smoke and need help to quit. E-cigarettes or smokeless tobacco still contain nicotine. Talk to your healthcare provider before you use these products.       Do not have caffeine. Caffeine can make your symptoms worse. Do not have foods or drinks that are meant to increase your energy level.      Limit or do not drink alcohol. Ask your healthcare provider if alcohol is safe for you. You may not be able to drink alcohol if you take certain anxiety or depression medicines. Limit alcohol to 1 drink per day if you are a woman. Limit alcohol to 2 drinks per day if you are a man. A drink of alcohol is 12 ounces of beer, 5 ounces of wine, or 1½ ounces of liquor.       Do not use drugs. Drugs can make your anxiety worse. It can also make anxiety hard to manage. Talk to your healthcare provider if you use drugs and want help to quit.

## 2025-04-05 NOTE — ED PROVIDER NOTE - CONSTITUTIONAL, MLM
Well appearing, awake, alert, oriented to person, place, time/situation and in moderate apparent anxious distress. normal...

## 2025-04-05 NOTE — ED ADULT NURSE NOTE - CHIEF COMPLAINT QUOTE
pt ambulatory to for severe abdominal pain that he woke up with and vomiting. was taken off zoloft 10 days ago because "his doctor felt like he didn't need it anymore". denies fever. extremely restless in triage, panicking c/o feeling very hot. endorsing intermittent panic attacks over the last few days. states he does not feel like this is a panic attack. denies SI/HI in triage.

## 2025-04-05 NOTE — ED PROVIDER NOTE - PATIENT PORTAL LINK FT
You can access the FollowMyHealth Patient Portal offered by [facility] by registering at the following website: [email or web address]. By joining [facility]’s FollowMyHealth portal, you will also be able to view your health information using other applications (apps) compatible with our system.

## 2025-04-05 NOTE — ED PROVIDER NOTE - OBJECTIVE STATEMENT
Patient is a 65-year-old male with history of anxiety who reports multiple panic attacks over the last couple weeks.  Patient seen in the emergency department for panic attack 2 weeks ago seen by psychiatry cleared for discharge home with private psychiatrist.  Patient denies SI or HI but reports that he sometimes feels lonely at home.  Patient reports that at 3 AM this morning he woke up with diffuse abdominal pain and multiple episodes of vomiting.  Patient denies any chest pain shortness of breath headache trauma or injury.  Patient denies any blurred vision double vision no blood in vomit no blood in stool.  Patient does report recent constipation.  Patient with no abdominal surgeries.  Patient denies any testicular pain or urinary complaints.  Patient denies any sick contacts or recent travel.

## 2025-04-05 NOTE — ED ADULT TRIAGE NOTE - CHIEF COMPLAINT QUOTE
pt ambulatory to for severe abdominal pain that he woke up with and vomiting. was taken off zoloft 10 days ago because his doctor felt like he didn't need it anymore. denies fever. extremely restless in triage, panicking c/o feeling very hot. endorsing intermittent panic attacks over the last few days. states he does not feel like this is a panic attack. denies SI/HI in triage. pt ambulatory to for severe abdominal pain that he woke up with and vomiting. was taken off zoloft 10 days ago because "his doctor felt like he didn't need it anymore". denies fever. extremely restless in triage, panicking c/o feeling very hot. endorsing intermittent panic attacks over the last few days. states he does not feel like this is a panic attack. denies SI/HI in triage.

## 2025-04-05 NOTE — ED ADULT NURSE NOTE - OBJECTIVE STATEMENT
The pt is a 66 y/o male A&OX4 presenting to the ED c/o abdominal pain, nausea and vomiting that started this morning @4am. Pt endorses that recently he has been having panic attacks and was seen in ED not too long ago. Pt endorses "not having a good bowel movement in the past few weeks" Respirations even and unlabored, no distress noted at this time. 20G IV placed into L forearm.

## 2025-04-05 NOTE — ED PROVIDER NOTE - PROGRESS NOTE DETAILS
Jovanna RODRIGUEZ: patient is feeling MUCH better at this time; tolerated po; abdomen- benign on re-eval; patient given home am dose of Klonopin; patient due to see private psychiatrist on Tuesday and comfortable with plan of care at this time. Strict return precautions given.

## 2025-04-06 NOTE — ED POST DISCHARGE NOTE - ADDITIONAL DOCUMENTATION
Pt called ED yesterday regarding medication question, all questions answered. Pt reports feeling better today.

## 2025-04-09 ENCOUNTER — NON-APPOINTMENT (OUTPATIENT)
Age: 65
End: 2025-04-09

## 2025-04-10 ENCOUNTER — APPOINTMENT (OUTPATIENT)
Dept: GASTROENTEROLOGY | Facility: CLINIC | Age: 65
End: 2025-04-10
Payer: COMMERCIAL

## 2025-04-10 VITALS
BODY MASS INDEX: 26.99 KG/M2 | SYSTOLIC BLOOD PRESSURE: 120 MMHG | WEIGHT: 162 LBS | HEIGHT: 65 IN | DIASTOLIC BLOOD PRESSURE: 82 MMHG

## 2025-04-10 DIAGNOSIS — K21.9 GASTRO-ESOPHAGEAL REFLUX DISEASE W/OUT ESOPHAGITIS: ICD-10-CM

## 2025-04-10 DIAGNOSIS — Z09 ENCOUNTER FOR FOLLOW-UP EXAMINATION AFTER COMPLETED TREATMENT FOR CONDITIONS OTHER THAN MALIGNANT NEOPLASM: ICD-10-CM

## 2025-04-10 DIAGNOSIS — R19.8 OTHER SPECIFIED SYMPTOMS AND SIGNS INVOLVING THE DIGESTIVE SYSTEM AND ABDOMEN: ICD-10-CM

## 2025-04-10 PROCEDURE — 99204 OFFICE O/P NEW MOD 45 MIN: CPT

## 2025-04-10 RX ORDER — FLUOXETINE HYDROCHLORIDE 10 MG/1
10 CAPSULE ORAL
Refills: 0 | Status: ACTIVE | COMMUNITY

## 2025-04-10 RX ORDER — FAMOTIDINE 20 MG/1
20 TABLET, FILM COATED ORAL
Refills: 0 | Status: ACTIVE | COMMUNITY

## 2025-04-10 RX ORDER — ONDANSETRON HYDROCHLORIDE 4 MG/1
4 TABLET, FILM COATED ORAL
Refills: 0 | Status: ACTIVE | COMMUNITY

## 2025-04-10 RX ORDER — CLONAZEPAM 0.5 MG/1
0.5 TABLET ORAL
Refills: 0 | Status: ACTIVE | COMMUNITY

## 2025-04-10 RX ORDER — TRAZODONE HYDROCHLORIDE 50 MG/1
50 TABLET ORAL
Refills: 0 | Status: ACTIVE | COMMUNITY

## 2025-04-10 RX ORDER — GALCANEZUMAB 100 MG/ML
INJECTION, SOLUTION SUBCUTANEOUS
Refills: 0 | Status: ACTIVE | COMMUNITY

## 2025-04-10 RX ORDER — BUPRENORPHINE HCL 8 MG
TABLET, SUBLINGUAL SUBLINGUAL
Refills: 0 | Status: ACTIVE | COMMUNITY

## 2025-04-10 RX ORDER — MINOXIDIL 2.5 MG/1
2.5 TABLET ORAL
Refills: 0 | Status: ACTIVE | COMMUNITY

## 2025-04-10 RX ORDER — SODIUM SULFATE, POTASSIUM SULFATE AND MAGNESIUM SULFATE 1.6; 3.13; 17.5 G/177ML; G/177ML; G/177ML
17.5-3.13-1.6 SOLUTION ORAL
Qty: 1 | Refills: 0 | Status: ACTIVE | COMMUNITY
Start: 2025-04-10 | End: 1900-01-01

## 2025-04-10 RX ORDER — TESTOSTERONE CYPIONATE 200 MG/ML
VIAL (ML) INTRAMUSCULAR
Refills: 0 | Status: ACTIVE | COMMUNITY

## 2025-04-10 RX ORDER — TADALAFIL 5 MG/1
5 TABLET ORAL
Refills: 0 | Status: ACTIVE | COMMUNITY

## 2025-04-10 RX ORDER — ALPRAZOLAM 0.5 MG/1
0.5 TABLET ORAL
Refills: 0 | Status: ACTIVE | COMMUNITY

## 2025-04-28 ENCOUNTER — NON-APPOINTMENT (OUTPATIENT)
Age: 65
End: 2025-04-28

## 2025-04-28 ENCOUNTER — APPOINTMENT (OUTPATIENT)
Dept: CARDIOLOGY | Facility: CLINIC | Age: 65
End: 2025-04-28
Payer: COMMERCIAL

## 2025-04-28 VITALS
BODY MASS INDEX: 27.49 KG/M2 | SYSTOLIC BLOOD PRESSURE: 130 MMHG | HEIGHT: 65 IN | DIASTOLIC BLOOD PRESSURE: 85 MMHG | WEIGHT: 165 LBS | OXYGEN SATURATION: 94 % | HEART RATE: 60 BPM

## 2025-04-28 DIAGNOSIS — I05.9 RHEUMATIC MITRAL VALVE DISEASE, UNSPECIFIED: ICD-10-CM

## 2025-04-28 DIAGNOSIS — Z83.79 FAMILY HISTORY OF OTHER DISEASES OF THE DIGESTIVE SYSTEM: ICD-10-CM

## 2025-04-28 DIAGNOSIS — Z72.0 TOBACCO USE: ICD-10-CM

## 2025-04-28 DIAGNOSIS — R07.89 OTHER CHEST PAIN: ICD-10-CM

## 2025-04-28 DIAGNOSIS — E78.00 PURE HYPERCHOLESTEROLEMIA, UNSPECIFIED: ICD-10-CM

## 2025-04-28 DIAGNOSIS — Z82.49 FAMILY HISTORY OF ISCHEMIC HEART DISEASE AND OTHER DISEASES OF THE CIRCULATORY SYSTEM: ICD-10-CM

## 2025-04-28 PROCEDURE — 99204 OFFICE O/P NEW MOD 45 MIN: CPT | Mod: 25

## 2025-04-28 PROCEDURE — 93000 ELECTROCARDIOGRAM COMPLETE: CPT

## 2025-05-13 ENCOUNTER — APPOINTMENT (OUTPATIENT)
Dept: CARDIOLOGY | Facility: CLINIC | Age: 65
End: 2025-05-13
Payer: COMMERCIAL

## 2025-05-13 PROCEDURE — 93015 CV STRESS TEST SUPVJ I&R: CPT

## 2025-05-14 ENCOUNTER — NON-APPOINTMENT (OUTPATIENT)
Age: 65
End: 2025-05-14

## 2025-05-16 ENCOUNTER — APPOINTMENT (OUTPATIENT)
Dept: GASTROENTEROLOGY | Facility: HOSPITAL | Age: 65
End: 2025-05-16

## 2025-05-16 ENCOUNTER — RESULT REVIEW (OUTPATIENT)
Age: 65
End: 2025-05-16

## 2025-05-16 ENCOUNTER — TRANSCRIPTION ENCOUNTER (OUTPATIENT)
Age: 65
End: 2025-05-16

## 2025-05-16 ENCOUNTER — OUTPATIENT (OUTPATIENT)
Dept: OUTPATIENT SERVICES | Facility: HOSPITAL | Age: 65
LOS: 1 days | End: 2025-05-16
Payer: COMMERCIAL

## 2025-05-16 DIAGNOSIS — R19.8 OTHER SPECIFIED SYMPTOMS AND SIGNS INVOLVING THE DIGESTIVE SYSTEM AND ABDOMEN: ICD-10-CM

## 2025-05-16 DIAGNOSIS — K21.9 GASTRO-ESOPHAGEAL REFLUX DISEASE WITHOUT ESOPHAGITIS: ICD-10-CM

## 2025-05-16 PROCEDURE — 43239 EGD BIOPSY SINGLE/MULTIPLE: CPT

## 2025-05-16 PROCEDURE — 88342 IMHCHEM/IMCYTCHM 1ST ANTB: CPT

## 2025-05-16 PROCEDURE — 88312 SPECIAL STAINS GROUP 1: CPT | Mod: 26

## 2025-05-16 PROCEDURE — 88313 SPECIAL STAINS GROUP 2: CPT | Mod: 26

## 2025-05-16 PROCEDURE — 88305 TISSUE EXAM BY PATHOLOGIST: CPT

## 2025-05-16 PROCEDURE — 45380 COLONOSCOPY AND BIOPSY: CPT | Mod: PT

## 2025-05-16 PROCEDURE — 45380 COLONOSCOPY AND BIOPSY: CPT

## 2025-05-16 PROCEDURE — 88305 TISSUE EXAM BY PATHOLOGIST: CPT | Mod: 26

## 2025-05-16 PROCEDURE — 88313 SPECIAL STAINS GROUP 2: CPT

## 2025-05-16 PROCEDURE — 88342 IMHCHEM/IMCYTCHM 1ST ANTB: CPT | Mod: 26

## 2025-05-16 PROCEDURE — 88312 SPECIAL STAINS GROUP 1: CPT

## 2025-05-16 DEVICE — ESOPHAGEAL BALLOON CATH CRE FIXED WIRE 6-7-8MM: Type: IMPLANTABLE DEVICE | Status: FUNCTIONAL

## 2025-05-16 DEVICE — HEMOSPRAY HEMOSTAT ENDO 7F: Type: IMPLANTABLE DEVICE | Status: FUNCTIONAL

## 2025-05-16 DEVICE — CLIP RESOLUTION 360 235CM: Type: IMPLANTABLE DEVICE | Status: FUNCTIONAL

## 2025-05-19 LAB — SURGICAL PATHOLOGY STUDY: SIGNIFICANT CHANGE UP

## 2025-05-27 RX ORDER — OMEPRAZOLE 20 MG/1
20 TABLET, DELAYED RELEASE ORAL
Qty: 90 | Refills: 0 | Status: ACTIVE | COMMUNITY
Start: 2025-05-27 | End: 1900-01-01

## 2025-05-28 DIAGNOSIS — Z12.11 ENCOUNTER FOR SCREENING FOR MALIGNANT NEOPLASM OF COLON: ICD-10-CM

## 2025-06-24 ENCOUNTER — NON-APPOINTMENT (OUTPATIENT)
Age: 65
End: 2025-06-24

## (undated) DEVICE — POLY TRAP ETRAP

## (undated) DEVICE — CATH IV SAFE BC 20G X 1.16" (PINK)

## (undated) DEVICE — SET IV PUMP BLOOD 1VALVE 180FILTER NON-DEHP

## (undated) DEVICE — NDL INJ SCLERO INTERJECT 23G

## (undated) DEVICE — TUBING IV SET SECOND 34" W/O LOK-BLUNT

## (undated) DEVICE — FORCEP RADIAL JAW 4 W NDL 2.4MM 2.8MM 240CM ORANGE DISP

## (undated) DEVICE — SNARE LRG

## (undated) DEVICE — TUBING IV SET SECONDARY 34"

## (undated) DEVICE — SUCTION YANKAUER TAPERED BULBOUS NO VENT

## (undated) DEVICE — BRUSH COLONOSCOPY CYTOLOGY

## (undated) DEVICE — TRAP QUICK CATCH  SINGL CHAMBER

## (undated) DEVICE — SNARE CAPTIVATOR II RND COLD 10MM

## (undated) DEVICE — FORCEP RADIAL JAW 4 W NDL 2.2MM 2.8MM 160CM YELLOW DISP

## (undated) DEVICE — ELCTR GROUNDING PAD ADULT COVIDIEN

## (undated) DEVICE — SENSOR O2 FINGER XL ADULT 24/BX 6BX/CA

## (undated) DEVICE — BRUSH CYTO ENDO

## (undated) DEVICE — ENDOCUFF VISION SZ 2 LG GRN

## (undated) DEVICE — TUBING SUCTION CONN 6FT STERILE

## (undated) DEVICE — SYR LUER SLIP TIP 50CC

## (undated) DEVICE — BITE BLOCK MAXI RUBBER STAMP

## (undated) DEVICE — TUBING CANNULA SALTER LABS NASAL ADULT 7FT

## (undated) DEVICE — MASK O2 ADLT POM ELITE W/ MED AND HI CONCEN M TO M 10FT

## (undated) DEVICE — SYR LUER LOK 30CC

## (undated) DEVICE — PLATE NESSY 170

## (undated) DEVICE — CLAMP BX HOT RAD JAW 3

## (undated) DEVICE — DRSG CURITY GAUZE SPONGE 4 X 4" 12-PLY

## (undated) DEVICE — SYR IV POSIFLUSH NS 3ML 30/TY

## (undated) DEVICE — SOL IRR NS 0.9% 250ML

## (undated) DEVICE — SYR LUER SLIP TIP 30CC

## (undated) DEVICE — FORMALIN CUPS 10% BUFFERED

## (undated) DEVICE — TUBING IV SET GRAVITY 3Y 100" MACRO

## (undated) DEVICE — MASK O2 NON REBREATH 3IN1 ADULT

## (undated) DEVICE — SOL IRR POUR H2O 500ML

## (undated) DEVICE — Device

## (undated) DEVICE — CATH ELECHMSTAT  INJ 7FR 210CM

## (undated) DEVICE — SYR ALLIANCE II INFLATION 60ML

## (undated) DEVICE — PACK IV START WITH CHG

## (undated) DEVICE — MARKER ENDO SPOT EX

## (undated) DEVICE — TRAP SPECIMEN SPUTUM 40CC

## (undated) DEVICE — CANISTER SUCTION 1200CC 10/SL

## (undated) DEVICE — TUBE O2 SUPL CRUSH RESIS CONN SOUTHSIDE ONLY

## (undated) DEVICE — CATH ELCTR GLIDE PRB 7FR

## (undated) DEVICE — RADIAL JAW 4 LG CAPACITY WITH NDL

## (undated) DEVICE — SNARE POLYP SENS 27MM 240CM

## (undated) DEVICE — ENDOCUFF VISION SZ 3 SM PRPL

## (undated) DEVICE — VALVE BIOPSY

## (undated) DEVICE — FORCEP RADIAL JAW 4 JUMBO 2.8MM 3.2MM 240CM ORANGE DISP

## (undated) DEVICE — CATH IV SAFE BC 22G X 1" (BLUE)

## (undated) DEVICE — STERIS DEFENDO 3-PIECE KIT (AIR/WATER, SUCTION & BIOPSY VALVES)